# Patient Record
Sex: FEMALE | Race: WHITE | Employment: FULL TIME | URBAN - METROPOLITAN AREA
[De-identification: names, ages, dates, MRNs, and addresses within clinical notes are randomized per-mention and may not be internally consistent; named-entity substitution may affect disease eponyms.]

---

## 2017-07-08 ENCOUNTER — HOSPITAL ENCOUNTER (OUTPATIENT)
Dept: MAMMOGRAPHY | Age: 67
Discharge: HOME | End: 2017-07-08
Attending: FAMILY MEDICINE
Payer: COMMERCIAL

## 2017-07-08 DIAGNOSIS — Z00.00 ENCOUNTER FOR GENERAL ADULT MEDICAL EXAMINATION WITHOUT ABNORMAL FINDINGS: ICD-10-CM

## 2017-07-08 PROCEDURE — 77080 DXA BONE DENSITY AXIAL: CPT

## 2017-07-16 ENCOUNTER — APPOINTMENT (OUTPATIENT)
Dept: CT IMAGING | Age: 67
End: 2017-07-16
Attending: EMERGENCY MEDICINE
Payer: COMMERCIAL

## 2017-07-16 ENCOUNTER — HOSPITAL ENCOUNTER (EMERGENCY)
Age: 67
Discharge: HOME | End: 2017-07-16
Attending: EMERGENCY MEDICINE
Payer: COMMERCIAL

## 2017-07-16 ENCOUNTER — APPOINTMENT (OUTPATIENT)
Dept: GENERAL RADIOLOGY | Age: 67
End: 2017-07-16
Attending: EMERGENCY MEDICINE
Payer: COMMERCIAL

## 2017-07-16 VITALS
RESPIRATION RATE: 10 BRPM | TEMPERATURE: 98.2 F | DIASTOLIC BLOOD PRESSURE: 74 MMHG | SYSTOLIC BLOOD PRESSURE: 113 MMHG | WEIGHT: 117 LBS | OXYGEN SATURATION: 100 % | HEIGHT: 64 IN | HEART RATE: 68 BPM | BODY MASS INDEX: 19.97 KG/M2

## 2017-07-16 DIAGNOSIS — R11.2 NON-INTRACTABLE VOMITING WITH NAUSEA, UNSPECIFIED VOMITING TYPE: ICD-10-CM

## 2017-07-16 DIAGNOSIS — R07.89 OTHER CHEST PAIN: Primary | ICD-10-CM

## 2017-07-16 LAB
ALBUMIN SERPL BCP-MCNC: 3.7 G/DL (ref 3.5–5)
ALBUMIN/GLOB SERPL: 1.1 {RATIO} (ref 1.1–2.2)
ALP SERPL-CCNC: 63 U/L (ref 45–117)
ALT SERPL-CCNC: 26 U/L (ref 12–78)
ANION GAP BLD CALC-SCNC: 10 MMOL/L (ref 5–15)
APPEARANCE UR: ABNORMAL
AST SERPL W P-5'-P-CCNC: 14 U/L (ref 15–37)
BACTERIA URNS QL MICRO: NEGATIVE /HPF
BASOPHILS # BLD AUTO: 0 K/UL (ref 0–0.1)
BASOPHILS # BLD: 0 % (ref 0–1)
BILIRUB SERPL-MCNC: 0.6 MG/DL (ref 0.2–1)
BILIRUB UR QL: NEGATIVE
BUN SERPL-MCNC: 12 MG/DL (ref 6–20)
BUN/CREAT SERPL: 16 (ref 12–20)
CALCIUM SERPL-MCNC: 8.8 MG/DL (ref 8.5–10.1)
CHLORIDE SERPL-SCNC: 103 MMOL/L (ref 97–108)
CK SERPL-CCNC: 71 U/L (ref 26–192)
CO2 SERPL-SCNC: 24 MMOL/L (ref 21–32)
COLOR UR: ABNORMAL
CREAT SERPL-MCNC: 0.75 MG/DL (ref 0.55–1.02)
D DIMER PPP FEU-MCNC: 0.2 MG/L FEU (ref 0–0.65)
EOSINOPHIL # BLD: 0 K/UL (ref 0–0.4)
EOSINOPHIL NFR BLD: 0 % (ref 0–7)
EPITH CASTS URNS QL MICRO: ABNORMAL /LPF
ERYTHROCYTE [DISTWIDTH] IN BLOOD BY AUTOMATED COUNT: 14 % (ref 11.5–14.5)
GLOBULIN SER CALC-MCNC: 3.5 G/DL (ref 2–4)
GLUCOSE SERPL-MCNC: 100 MG/DL (ref 65–100)
GLUCOSE UR STRIP.AUTO-MCNC: NEGATIVE MG/DL
HCT VFR BLD AUTO: 39.4 % (ref 35–47)
HGB BLD-MCNC: 13.3 G/DL (ref 11.5–16)
HGB UR QL STRIP: NEGATIVE
HYALINE CASTS URNS QL MICRO: ABNORMAL /LPF (ref 0–5)
KETONES UR QL STRIP.AUTO: ABNORMAL MG/DL
LEUKOCYTE ESTERASE UR QL STRIP.AUTO: NEGATIVE
LIPASE SERPL-CCNC: 125 U/L (ref 73–393)
LYMPHOCYTES # BLD AUTO: 18 % (ref 12–49)
LYMPHOCYTES # BLD: 1 K/UL (ref 0.8–3.5)
MCH RBC QN AUTO: 28.5 PG (ref 26–34)
MCHC RBC AUTO-ENTMCNC: 33.8 G/DL (ref 30–36.5)
MCV RBC AUTO: 84.4 FL (ref 80–99)
MONOCYTES # BLD: 0.4 K/UL (ref 0–1)
MONOCYTES NFR BLD AUTO: 7 % (ref 5–13)
NEUTS SEG # BLD: 4.1 K/UL (ref 1.8–8)
NEUTS SEG NFR BLD AUTO: 75 % (ref 32–75)
NITRITE UR QL STRIP.AUTO: NEGATIVE
PH UR STRIP: 8.5 [PH] (ref 5–8)
PLATELET # BLD AUTO: 252 K/UL (ref 150–400)
POTASSIUM SERPL-SCNC: 3.5 MMOL/L (ref 3.5–5.1)
PROT SERPL-MCNC: 7.2 G/DL (ref 6.4–8.2)
PROT UR STRIP-MCNC: NEGATIVE MG/DL
RBC # BLD AUTO: 4.67 M/UL (ref 3.8–5.2)
RBC #/AREA URNS HPF: ABNORMAL /HPF (ref 0–5)
SODIUM SERPL-SCNC: 137 MMOL/L (ref 136–145)
SP GR UR REFRACTOMETRY: 1.01 (ref 1–1.03)
T4 FREE SERPL-MCNC: 2.2 NG/DL (ref 0.8–1.5)
TROPONIN I BLD-MCNC: <0.04 NG/ML (ref 0–0.08)
TROPONIN I SERPL-MCNC: <0.04 NG/ML
TSH SERPL DL<=0.05 MIU/L-ACNC: 1.72 UIU/ML (ref 0.36–3.74)
UA: UC IF INDICATED,UAUC: ABNORMAL
UROBILINOGEN UR QL STRIP.AUTO: 0.2 EU/DL (ref 0.2–1)
WBC # BLD AUTO: 5.5 K/UL (ref 3.6–11)
WBC URNS QL MICRO: ABNORMAL /HPF (ref 0–4)

## 2017-07-16 PROCEDURE — 96375 TX/PRO/DX INJ NEW DRUG ADDON: CPT

## 2017-07-16 PROCEDURE — 84439 ASSAY OF FREE THYROXINE: CPT | Performed by: EMERGENCY MEDICINE

## 2017-07-16 PROCEDURE — 74011636320 HC RX REV CODE- 636/320: Performed by: EMERGENCY MEDICINE

## 2017-07-16 PROCEDURE — 84484 ASSAY OF TROPONIN QUANT: CPT | Performed by: EMERGENCY MEDICINE

## 2017-07-16 PROCEDURE — 74011250636 HC RX REV CODE- 250/636: Performed by: EMERGENCY MEDICINE

## 2017-07-16 PROCEDURE — 85025 COMPLETE CBC W/AUTO DIFF WBC: CPT | Performed by: EMERGENCY MEDICINE

## 2017-07-16 PROCEDURE — 81001 URINALYSIS AUTO W/SCOPE: CPT | Performed by: EMERGENCY MEDICINE

## 2017-07-16 PROCEDURE — 74011000258 HC RX REV CODE- 258: Performed by: EMERGENCY MEDICINE

## 2017-07-16 PROCEDURE — 71275 CT ANGIOGRAPHY CHEST: CPT

## 2017-07-16 PROCEDURE — 84443 ASSAY THYROID STIM HORMONE: CPT | Performed by: EMERGENCY MEDICINE

## 2017-07-16 PROCEDURE — 36415 COLL VENOUS BLD VENIPUNCTURE: CPT | Performed by: EMERGENCY MEDICINE

## 2017-07-16 PROCEDURE — 83690 ASSAY OF LIPASE: CPT | Performed by: EMERGENCY MEDICINE

## 2017-07-16 PROCEDURE — 93005 ELECTROCARDIOGRAM TRACING: CPT

## 2017-07-16 PROCEDURE — 96361 HYDRATE IV INFUSION ADD-ON: CPT

## 2017-07-16 PROCEDURE — 74011000250 HC RX REV CODE- 250: Performed by: EMERGENCY MEDICINE

## 2017-07-16 PROCEDURE — 80053 COMPREHEN METABOLIC PANEL: CPT | Performed by: EMERGENCY MEDICINE

## 2017-07-16 PROCEDURE — 99285 EMERGENCY DEPT VISIT HI MDM: CPT

## 2017-07-16 PROCEDURE — 96374 THER/PROPH/DIAG INJ IV PUSH: CPT

## 2017-07-16 PROCEDURE — 82550 ASSAY OF CK (CPK): CPT | Performed by: EMERGENCY MEDICINE

## 2017-07-16 PROCEDURE — 85379 FIBRIN DEGRADATION QUANT: CPT | Performed by: EMERGENCY MEDICINE

## 2017-07-16 RX ORDER — SODIUM CHLORIDE 0.9 % (FLUSH) 0.9 %
10 SYRINGE (ML) INJECTION
Status: COMPLETED | OUTPATIENT
Start: 2017-07-16 | End: 2017-07-16

## 2017-07-16 RX ORDER — FAMOTIDINE 10 MG/ML
20 INJECTION INTRAVENOUS
Status: COMPLETED | OUTPATIENT
Start: 2017-07-16 | End: 2017-07-16

## 2017-07-16 RX ORDER — ONDANSETRON 2 MG/ML
4 INJECTION INTRAMUSCULAR; INTRAVENOUS
Status: COMPLETED | OUTPATIENT
Start: 2017-07-16 | End: 2017-07-16

## 2017-07-16 RX ORDER — ONDANSETRON 4 MG/1
4 TABLET, ORALLY DISINTEGRATING ORAL
Qty: 12 TAB | Refills: 0 | Status: SHIPPED | OUTPATIENT
Start: 2017-07-16 | End: 2019-03-14

## 2017-07-16 RX ADMIN — FAMOTIDINE 20 MG: 10 INJECTION, SOLUTION INTRAVENOUS at 13:43

## 2017-07-16 RX ADMIN — SODIUM CHLORIDE 1000 ML: 900 INJECTION, SOLUTION INTRAVENOUS at 13:40

## 2017-07-16 RX ADMIN — ONDANSETRON 4 MG: 2 INJECTION INTRAMUSCULAR; INTRAVENOUS at 13:40

## 2017-07-16 RX ADMIN — SODIUM CHLORIDE 100 ML: 900 INJECTION, SOLUTION INTRAVENOUS at 16:08

## 2017-07-16 RX ADMIN — IOPAMIDOL 70 ML: 755 INJECTION, SOLUTION INTRAVENOUS at 16:08

## 2017-07-16 RX ADMIN — Medication 10 ML: at 16:08

## 2017-07-16 NOTE — ED NOTES
Discharge instructions and Rx reviewed with pt. and family. No obvious distress noted at time of discharge. Ambulatory on own accord with family.

## 2017-07-16 NOTE — DISCHARGE INSTRUCTIONS
We hope that we have addressed all of your medical concerns. The examination and treatment you received in the Emergency Department were for an emergent problem and were not intended as complete care. It is important that you follow up with your healthcare provider(s) for ongoing care. If your symptoms worsen or do not improve as expected, and you are unable to reach your usual health care provider(s), you should return to the Emergency Department. Today's healthcare is undergoing tremendous change, and patient satisfaction surveys are one of the many tools to assess the quality of medical care. You may receive a survey from the [facility] regarding your experience in the Emergency Department. I hope that your experience has been completely positive, particularly the medical care that I provided. As such, please participate in the survey; anything less than excellent does not meet my expectations or intentions. [facility] and [facility] participate in nationally recognized quality of care measures. If your blood pressure is greater than 120/80, as reported below, we urge that you seek medical care to address the potential of high blood pressure, commonly known as hypertension. Hypertension can be hereditary or can be caused by certain medical conditions, pain, stress, or \"white coat syndrome. \"       Please make an appointment with your health care provider(s) for follow up of your Emergency Department visit. VITALS:   Patient Vitals for the past 8 hrs:   Temp Pulse Resp BP SpO2   07/16/17 1600 - 71 17 110/56 98 %   07/16/17 1400 - 75 15 125/83 100 %   07/16/17 1300 - 71 13 106/65 98 %   07/16/17 1222 98.8 °F (37.1 °C) 75 18 138/80 100 %          Thank you for allowing us to provide you with medical care today. We realize that you have many choices for your emergency care needs.   Please choose us in the future for any continued health care needs. Brigido Dandy, NP    [address] [facility].   Office: [phone]            Recent Results (from the past 24 hour(s))   EKG, 12 LEAD, INITIAL    Collection Time: 07/16/17 12:23 PM   Result Value Ref Range    Ventricular Rate 75 BPM    Atrial Rate 75 BPM    P-R Interval 176 ms    QRS Duration 100 ms    Q-T Interval 410 ms    QTC Calculation (Bezet) 457 ms    Calculated P Axis 77 degrees    Calculated R Axis -73 degrees    Calculated T Axis 48 degrees    Diagnosis       Normal sinus rhythm  Left axis deviation  Low voltage QRS  No previous ECGs available     CBC WITH AUTOMATED DIFF    Collection Time: 07/16/17 12:36 PM   Result Value Ref Range    WBC 5.5 3.6 - 11.0 K/uL    RBC 4.67 3.80 - 5.20 M/uL    HGB 13.3 11.5 - 16.0 g/dL    HCT 39.4 35.0 - 47.0 %    MCV 84.4 80.0 - 99.0 FL    MCH 28.5 26.0 - 34.0 PG    MCHC 33.8 30.0 - 36.5 g/dL    RDW 14.0 11.5 - 14.5 %    PLATELET 846 713 - 072 K/uL    NEUTROPHILS 75 32 - 75 %    LYMPHOCYTES 18 12 - 49 %    MONOCYTES 7 5 - 13 %    EOSINOPHILS 0 0 - 7 %    BASOPHILS 0 0 - 1 %    ABS. NEUTROPHILS 4.1 1.8 - 8.0 K/UL    ABS. LYMPHOCYTES 1.0 0.8 - 3.5 K/UL    ABS. MONOCYTES 0.4 0.0 - 1.0 K/UL    ABS. EOSINOPHILS 0.0 0.0 - 0.4 K/UL    ABS. BASOPHILS 0.0 0.0 - 0.1 K/UL   METABOLIC PANEL, COMPREHENSIVE    Collection Time: 07/16/17 12:36 PM   Result Value Ref Range    Sodium 137 136 - 145 mmol/L    Potassium 3.5 3.5 - 5.1 mmol/L    Chloride 103 97 - 108 mmol/L    CO2 24 21 - 32 mmol/L    Anion gap 10 5 - 15 mmol/L    Glucose 100 65 - 100 mg/dL    BUN 12 6 - 20 MG/DL    Creatinine 0.75 0.55 - 1.02 MG/DL    BUN/Creatinine ratio 16 12 - 20      GFR est AA >60 >60 ml/min/1.73m2    GFR est non-AA >60 >60 ml/min/1.73m2    Calcium 8.8 8.5 - 10.1 MG/DL    Bilirubin, total 0.6 0.2 - 1.0 MG/DL    ALT (SGPT) 26 12 - 78 U/L    AST (SGOT) 14 (L) 15 - 37 U/L    Alk.  phosphatase 63 45 - 117 U/L    Protein, total 7.2 6.4 - 8.2 g/dL    Albumin 3.7 3.5 - 5.0 g/dL    Globulin 3.5 2.0 - 4.0 g/dL    A-G Ratio 1.1 1.1 - 2.2     TROPONIN I    Collection Time: 07/16/17 12:36 PM   Result Value Ref Range    Troponin-I, Qt. <0.04 <0.05 ng/mL   TSH 3RD GENERATION    Collection Time: 07/16/17 12:36 PM   Result Value Ref Range    TSH 1.72 0.36 - 3.74 uIU/mL   T4, FREE    Collection Time: 07/16/17 12:36 PM   Result Value Ref Range    T4, Free 2.2 (H) 0.8 - 1.5 NG/DL   CK W/ REFLX CKMB    Collection Time: 07/16/17 12:36 PM   Result Value Ref Range    CK 71 26 - 192 U/L   LIPASE    Collection Time: 07/16/17 12:36 PM   Result Value Ref Range    Lipase 125 73 - 393 U/L   D DIMER    Collection Time: 07/16/17 12:36 PM   Result Value Ref Range    D-dimer 0.20 0.00 - 0.65 mg/L FEU   URINALYSIS W/ REFLEX CULTURE    Collection Time: 07/16/17  1:45 PM   Result Value Ref Range    Color YELLOW/STRAW      Appearance TURBID (A) CLEAR      Specific gravity 1.012 1.003 - 1.030      pH (UA) 8.5 (H) 5.0 - 8.0      Protein NEGATIVE  NEG mg/dL    Glucose NEGATIVE  NEG mg/dL    Ketone TRACE (A) NEG mg/dL    Bilirubin NEGATIVE  NEG      Blood NEGATIVE  NEG      Urobilinogen 0.2 0.2 - 1.0 EU/dL    Nitrites NEGATIVE  NEG      Leukocyte Esterase NEGATIVE  NEG      WBC 0-4 0 - 4 /hpf    RBC 0-5 0 - 5 /hpf    Epithelial cells FEW FEW /lpf    Bacteria NEGATIVE  NEG /hpf    UA:UC IF INDICATED CULTURE NOT INDICATED BY UA RESULT CNI      Hyaline cast 0-2 0 - 5 /lpf       Cta Chest W Or W Wo Cont    Result Date: 7/16/2017  EXAM:  CTA CHEST W OR W WO CONT INDICATION:   chest pain; requested by cardiology Additional history: Chest pain and discomfort for \"a while now. \" 2 episodes of emesis prior to arrival. Patient reports feeling something rip in her back couple weeks ago. COMPARISON: None. . TECHNIQUE: Precontrast  images were obtained to localize the volume for acquisition.  Multislice helical CT arteriography was performed from the diaphragm to the thoracic inlet during uneventful rapid bolus intravenous contrast administration. Lung and soft tissue windows were generated. Coronal and sagittal images were generated and 3D post processing consisting of coronal maximum intensity images was performed. CT dose reduction was achieved through use of a standardized protocol tailored for this examination and automatic exposure control for dose modulation. Bryan Hargrove FINDINGS: CHEST: Chest wall/thoracic inlet: Within normal limits. Thyroid: Within normal limits. Mediastinum/ovidio: Within normal limits. Heart/vessels: Within normal limits. Lungs/Pleura: Within normal limits. . INCIDENTALLY IMAGED ABDOMEN: Incidentally imaged portions of the abdomen appear unremarkable. . MSK: Within normal limits. .    IMPRESSION: 1. No visualized pulmonary embolus. Nausea and Vomiting: Care Instructions  Your Care Instructions    When you are nauseated, you may feel weak and sweaty and notice a lot of saliva in your mouth. Nausea often leads to vomiting. Most of the time you do not need to worry about nausea and vomiting, but they can be signs of other illnesses. Two common causes of nausea and vomiting are stomach flu and food poisoning. Nausea and vomiting from viral stomach flu will usually start to improve within 24 hours. Nausea and vomiting from food poisoning may last from 12 to 48 hours. The doctor has checked you carefully, but problems can develop later. If you notice any problems or new symptoms, get medical treatment right away. Follow-up care is a key part of your treatment and safety. Be sure to make and go to all appointments, and call your doctor if you are having problems. It's also a good idea to know your test results and keep a list of the medicines you take. How can you care for yourself at home? · To prevent dehydration, drink plenty of fluids, enough so that your urine is light yellow or clear like water. Choose water and other caffeine-free clear liquids until you feel better.  If you have kidney, heart, or liver disease and have to limit fluids, talk with your doctor before you increase the amount of fluids you drink. · Rest in bed until you feel better. · When you are able to eat, try clear soups, mild foods, and liquids until all symptoms are gone for 12 to 48 hours. Other good choices include dry toast, crackers, cooked cereal, and gelatin dessert, such as Jell-O. When should you call for help? Call 911 anytime you think you may need emergency care. For example, call if:  · You passed out (lost consciousness). Call your doctor now or seek immediate medical care if:  · You have symptoms of dehydration, such as:  ¨ Dry eyes and a dry mouth. ¨ Passing only a little dark urine. ¨ Feeling thirstier than usual.  · You have new or worsening belly pain. · You have a new or higher fever. · You vomit blood or what looks like coffee grounds. Watch closely for changes in your health, and be sure to contact your doctor if:  · You have ongoing nausea and vomiting. · Your vomiting is getting worse. · Your vomiting lasts longer than 2 days. · You are not getting better as expected. Where can you learn more? Go to http://buck-dario.info/. Enter 25 494182 in the search box to learn more about \"Nausea and Vomiting: Care Instructions. \"  Current as of: March 20, 2017  Content Version: 11.3  © 1860-3450 [employer]. Care instructions adapted under license by [employer] (which disclaims liability or warranty for this information). If you have questions about a medical condition or this instruction, always ask your healthcare professional. [place] [address] any warranty or liability for your use of this information. Chest Pain: Care Instructions  Your Care Instructions  There are many things that can cause chest pain. Some are not serious and will get better on their own in a few days. But some kinds of chest pain need more testing and treatment. Your doctor may have recommended a follow-up visit in the next 8 to 12 hours. If you are not getting better, you may need more tests or treatment. Even though your doctor has released you, you still need to watch for any problems. The doctor carefully checked you, but sometimes problems can develop later. If you have new symptoms or if your symptoms do not get better, get medical care right away. If you have worse or different chest pain or pressure that lasts more than 5 minutes or you passed out (lost consciousness), call 911 or seek other emergency help right away. A medical visit is only one step in your treatment. Even if you feel better, you still need to do what your doctor recommends, such as going to all suggested follow-up appointments and taking medicines exactly as directed. This will help you recover and help prevent future problems. How can you care for yourself at home? · Rest until you feel better. · Take your medicine exactly as prescribed. Call your doctor if you think you are having a problem with your medicine. · Do not drive after taking a prescription pain medicine. When should you call for help? Call 911 if:  · You passed out (lost consciousness). · You have severe difficulty breathing. · You have symptoms of a heart attack. These may include:  ¨ Chest pain or pressure, or a strange feeling in your chest.  ¨ Sweating. ¨ Shortness of breath. ¨ Nausea or vomiting. ¨ Pain, pressure, or a strange feeling in your back, neck, jaw, or upper belly or in one or both shoulders or arms. ¨ Lightheadedness or sudden weakness. ¨ A fast or irregular heartbeat. After you call 911, the  may tell you to chew 1 adult-strength or 2 to 4 low-dose aspirin. Wait for an ambulance. Do not try to drive yourself. Call your doctor today if:  · You have any trouble breathing. · Your chest pain gets worse. · You are dizzy or lightheaded, or you feel like you may faint.   · You are not getting better as expected. · You are having new or different chest pain. Where can you learn more? Go to http://buck-dario.info/. Enter A120 in the search box to learn more about \"Chest Pain: Care Instructions. \"  Current as of: March 20, 2017  Content Version: 11.3  © 9525-8828 [employer]. Care instructions adapted under license by [employer] (which disclaims liability or warranty for this information). If you have questions about a medical condition or this instruction, always ask your healthcare professional. [address] any warranty or liability for your use of this information.

## 2017-07-16 NOTE — ED PROVIDER NOTES
Patient is a 78 y.o. female presenting with chest pain. Chest Pain (Angina)    Associated symptoms include nausea and vomiting. Pertinent negatives include no abdominal pain, no back pain, no headaches and no shortness of breath. Pt reports abrupt onset on nausea and episodic non bloody vomiting this morning shortly after getting up this morning. She states that she has not felt well for several weeks and has been under evaluation by her PCP. She went to a cardiology appointment (Dr. Kindra Norris) on Friday and was told that her EKG had a few abnormalities but that he was not concerned. 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 Denies fever, cough, cold symptoms, headache, neck pain, visual changes, focal weakness or rash. Denies any difficulty breathing, difficulty swallowing, SOB or abdominal pain. Denies any urinary symptoms, constipation or diarrhea. Pt. Reports eating a slice of toast to calm the nausea but vomited shortly afterward. She has not had any medications today prior to arrival.         History reviewed. No pertinent past medical history. Past Surgical History:   Procedure Laterality Date    DELIVERY            History reviewed. No pertinent family history. Social History     Social History    Marital status:      Spouse name: N/A    Number of children: N/A    Years of education: N/A     Occupational History    Not on file. Social History Main Topics    Smoking status: Never Smoker    Smokeless tobacco: Never Used    Alcohol use Yes    Drug use: Not on file    Sexual activity: Not on file     Other Topics Concern    Not on file     Social History Narrative         ALLERGIES: Pcn [penicillins]    Review of Systems   Constitutional: Negative for activity change and appetite change. HENT: Negative for facial swelling, sore throat and trouble swallowing.     Eyes: Negative. Respiratory: Negative for shortness of breath. Cardiovascular: Positive for chest pain. Gastrointestinal: Positive for nausea and vomiting. Negative for abdominal pain and diarrhea. Genitourinary: Negative for dysuria. Musculoskeletal: Negative for back pain and neck pain. Skin: Negative for color change. Neurological: Negative for headaches. Psychiatric/Behavioral: Negative. Vitals:    07/16/17 1222   BP: 138/80   Pulse: 75   Resp: 18   Temp: 98.8 °F (37.1 °C)   SpO2: 100%   Weight: 53.1 kg (117 lb)   Height: 5' 4\" (1.626 m)            Physical Exam   Constitutional: She is oriented to person, place, and time. She appears well-nourished. White female; non smoker;    HENT:   Head: Normocephalic. Right Ear: External ear normal.   Left Ear: External ear normal.   Mouth/Throat: Oropharynx is clear and moist.   Eyes: Pupils are equal, round, and reactive to light. Neck: Normal range of motion. Neck supple. Cardiovascular: Normal rate and regular rhythm. Pulmonary/Chest: Effort normal and breath sounds normal. She exhibits tenderness. Reports chest pressure   Abdominal: Soft. Bowel sounds are normal.   Musculoskeletal: Normal range of motion. Lymphadenopathy:     She has no cervical adenopathy. Neurological: She is alert and oriented to person, place, and time. Skin: Skin is warm and dry. No rash noted. Nursing note and vitals reviewed. [facility]  ED Course       Procedures           EKG reveals NSR wutg Qwaves V1-V3; ventricular rate 75. Reviewed by Dr. Laquetta Castleman and me. Zack Cifuentes NP    Discussed plan of care with Dr. Laquetta Castleman. Zack Cifuentes NP  Pt has been re-examined and is feeling much better. 7:48 PM  Patient's results and plan of care have been reviewed with her and her family.   Patient and/or family have verbally conveyed their understanding and agreement of the patient's signs, symptoms, diagnosis, treatment and prognosis and additionally agree to follow up as recommended or return to the Emergency Room should her condition change prior to follow-up. Discharge instructions have also been provided to the patient with some educational information regarding her diagnosis as well a list of reasons why she would want to return to the ER prior to her follow-up appointment should her condition change. Brad King, LUCA

## 2017-07-16 NOTE — ED TRIAGE NOTES
TRIAGE NOTE: Patient arrives via EMS from [facility] for chest pain. Patient reports discomfort for \"a while now\". Seen by PCP last week. Patient denies pain, \"more discomfort\", a tightness that goes across her back. Patient reports feeling something rip in her back a couple weeks ago. 2 episodes of vomiting prior to arrival. 324mg of aspiring en route.

## 2017-07-17 LAB
ATRIAL RATE: 75 BPM
CALCULATED P AXIS, ECG09: 77 DEGREES
CALCULATED R AXIS, ECG10: -73 DEGREES
CALCULATED T AXIS, ECG11: 48 DEGREES
DIAGNOSIS, 93000: NORMAL
P-R INTERVAL, ECG05: 176 MS
Q-T INTERVAL, ECG07: 410 MS
QRS DURATION, ECG06: 100 MS
QTC CALCULATION (BEZET), ECG08: 457 MS
VENTRICULAR RATE, ECG03: 75 BPM

## 2018-02-23 ENCOUNTER — HOSPITAL ENCOUNTER (OUTPATIENT)
Dept: CT IMAGING | Age: 68
Discharge: HOME | End: 2018-02-23
Payer: COMMERCIAL

## 2018-02-23 DIAGNOSIS — F07.81 POST CONCUSSION SYNDROME: ICD-10-CM

## 2018-02-23 PROCEDURE — 70450 CT HEAD/BRAIN W/O DYE: CPT

## 2018-03-13 ENCOUNTER — HOSPITAL ENCOUNTER (OUTPATIENT)
Dept: GENERAL RADIOLOGY | Age: 68
Discharge: HOME | End: 2018-03-13
Payer: COMMERCIAL

## 2018-03-13 DIAGNOSIS — M54.2 NECK PAIN: ICD-10-CM

## 2018-03-13 PROCEDURE — 72052 X-RAY EXAM NECK SPINE 6/>VWS: CPT

## 2018-06-04 ENCOUNTER — HOSPITAL ENCOUNTER (OUTPATIENT)
Dept: MAMMOGRAPHY | Age: 68
Discharge: HOME | End: 2018-06-04
Attending: FAMILY MEDICINE
Payer: COMMERCIAL

## 2018-06-04 DIAGNOSIS — Z12.39 SCREENING BREAST EXAMINATION: ICD-10-CM

## 2018-06-04 PROCEDURE — 77067 SCR MAMMO BI INCL CAD: CPT

## 2019-02-11 ENCOUNTER — HOSPITAL ENCOUNTER (OUTPATIENT)
Dept: MRI IMAGING | Age: 69
Discharge: HOME | End: 2019-02-11
Attending: PHYSICAL MEDICINE & REHABILITATION
Payer: COMMERCIAL

## 2019-02-11 DIAGNOSIS — M43.10 ANTEROLISTHESIS: ICD-10-CM

## 2019-02-11 PROCEDURE — 72141 MRI NECK SPINE W/O DYE: CPT

## 2019-03-14 ENCOUNTER — OFFICE VISIT (OUTPATIENT)
Dept: NEUROLOGY | Age: 69
End: 2019-03-14

## 2019-03-14 VITALS
SYSTOLIC BLOOD PRESSURE: 108 MMHG | DIASTOLIC BLOOD PRESSURE: 68 MMHG | HEART RATE: 71 BPM | BODY MASS INDEX: 20.43 KG/M2 | OXYGEN SATURATION: 98 % | RESPIRATION RATE: 18 BRPM | WEIGHT: 119 LBS

## 2019-03-14 DIAGNOSIS — R42 DIZZINESS: ICD-10-CM

## 2019-03-14 DIAGNOSIS — F07.81 POST CONCUSSION SYNDROME: Primary | ICD-10-CM

## 2019-03-14 RX ORDER — CYCLOBENZAPRINE HCL 5 MG
5 TABLET ORAL AS NEEDED
COMMUNITY
End: 2019-06-10

## 2019-03-14 RX ORDER — ASCORBIC ACID 500 MG
TABLET ORAL
COMMUNITY

## 2019-03-14 RX ORDER — VITAMIN E CAP 100 UNIT 100 UNIT
CAP ORAL DAILY
COMMUNITY

## 2019-03-14 RX ORDER — MELATONIN
DAILY
COMMUNITY

## 2019-03-14 RX ORDER — CALCIUM CARBONATE 500(1250)
TABLET ORAL DAILY
COMMUNITY

## 2019-03-14 RX ORDER — LANOLIN ALCOHOL/MO/W.PET/CERES
1000 CREAM (GRAM) TOPICAL DAILY
COMMUNITY

## 2019-03-14 NOTE — PROGRESS NOTES
Shannan Hernandez PATIENT EVALUATION/CONSULTATION       PATIENT NAME: Vitor Phillips    MRN: [id number]    REASON FOR CONSULTATION: Head injury    03/14/19      Previous records (physician notes, laboratory reports, and radiology reports) and imaging studies were reviewed and summarized. My recommendations will be communicated back to the patient's physician(s) via electronic medical record and/or by [facility] [facility] mail. HISTORY OF PRESENT ILLNESS:  Vitor Phillips is a 77 y.o. right handed female presenting for evaluation of head injury. She reports a fall caused by brief syncope with frontal head injury in 2017. She underwent extensive cardiac evaluations which were normal per patient. No reported convulsions or confusion after the incident. She did not experience any significant symptoms following her head injury. Approximately 3 months later, she hit the frontal region again with the back richard of her car, no LOC but saw \"stars\". She noted cognitive issues following her second head injury. She specifically reports difficulty playing her organ. She did not note significant headaches but reported cervicalgia/shoulder pain b/l afterwards with associated dizziness. Things improved with time, however she is still experiencing some intermittent \"foggy headedness\" and dizziness. She also endorses some occasional \"electrical feeling\" over the entire head lasting several hours and occurring once monthly at most with associated \"bruising/discoloration\" over the L temple region which may persist up to 48 hours. She shows pictures of this discoloration today. She denies vision loss but endorses some tenderness to palpation. She completed concussion therapy for PCS early 2018. She underwent head CT/MRI C spine since her injuries showing no acute process or significant canal/neuroforaminal stenosis. PAST MEDICAL HISTORY:  History reviewed. No pertinent past medical history.     PAST SURGICAL HISTORY:  Past Surgical History:   Procedure Laterality Date    DELIVERY          FAMILY HISTORY:   History reviewed. No pertinent family history. SOCIAL HISTORY:  Social History     Socioeconomic History    Marital status:      Spouse name: Not on file    Number of children: Not on file    Years of education: Not on file    Highest education level: Not on file   Tobacco Use    Smoking status: Never Smoker    Smokeless tobacco: Never Used   Substance and Sexual Activity    Alcohol use: Yes         MEDICATIONS:   Current Outpatient Medications   Medication Sig Dispense Refill    cyanocobalamin (VITAMIN B-12) 1,000 mcg tablet Take 1,000 mcg by mouth daily.  ascorbic acid, vitamin C, (VITAMIN C) 500 mg tablet Take  by mouth.  calcium carbonate (OS-CHERIE) 500 mg calcium (1,250 mg) tablet Take  by mouth daily.  cholecalciferol (VITAMIN D3) 1,000 unit tablet Take  by mouth daily.  vitamin e (E GEMS) 100 unit capsule Take  by mouth daily.  cyclobenzaprine (FLEXERIL) 5 mg tablet Take 5 mg by mouth as needed for Muscle Spasm(s). ALLERGIES:  Allergies   Allergen Reactions    Pcn [Penicillins] Not Reported This Time         REVIEW OF SYSTEMS:  10 point ROS reviewed with patient. Please see scanned document under media. PHYSICAL EXAM:  Vital Signs:   Visit Vitals  /68   Pulse 71   Resp 18   Wt 54 kg (119 lb)   SpO2 98%   BMI 20.43 kg/m²        General Medical Exam:  General:  Well appearing, comfortable, in no apparent distress. Eyes/ENT: see cranial nerve examination. Neck: No masses appreciated. Full range of motion without tenderness. Respiratory:  Clear to auscultation, good air entry bilaterally. Cardiac:  Regular rate and rhythm, no murmur. GI:  Soft, non-tender, non-distended abdomen. Bowel sounds normal. No masses, organomegaly. Extremities:  No deformities, edema, or skin discoloration. Skin:  No rashes or lesions.   B/L palpable, non-tender temporal arteries. Neurological:  · Mental Status:  Alert and oriented to person, place, and time with fluent speech. · Cranial Nerves:   CNII/III/IV/VI: visual fields full to confrontation, EOMI, PERRL, no ptosis or nystagmus. CN V: Facial sensation intact bilaterally, masseter 5/5   CN VII: Facial muscles symmetric and strong   CN VIII: Hears finger rub well bilaterally, intact vestibular function   CN IX/X: Normal palatal movement   CN XI: Full strength shoulder shrug bilaterally   CN XII: Tongue protrusion full and midline without fasciculation or atrophy  · Motor: Normal tone and muscle bulk with no pronator drift. Individual muscle group testing:  Shoulder abduction:   Left:5/5   Right : 5/5    Shoulder adduction:   Left:5/5   Right : 5/5    Elbow flexion:      Left:5/5   Right : 5/5  Elbow extension:    Left:5/5   Right : 5/5   Wrist flexion:    Left:5/5   Right : 5/5  Wrist extension:    Left:5/5   Right : 5/5  Arm pronation:   Left:5/5   Right : 5/5  Arm supination:   Left:5/5   Right : 5/5    Finger flexion:    Left:5/5   Right : 5/5    Finger extension:   Left:5/5   Right : 5/5   Finger abduction:  Left:5/5   Right : 5/5   Finger adduction:   Left:5/5   Right : 5/5  Hip flexion:     Left:5/5   Right : 5/5         Hip extension:   Left:5/5   Right : 5/5    Knee flexion:    Left:5/5   Right : 5/5    Knee extension:   Left:5/5   Right : 5/5    Dorsiflexion:     Left:5/5   Right : 5/5  Plantar flexion:    Left:5/5   Right : 5/5      · MSRs: No crossed adductors or clonus. RIGHT  LEFT   Brachioradialis 2+ 2+   Biceps 2+ 2+   Triceps 2+ 2+   Knee 2+ 2+   Achilles 2+ 2+        Plantar response Downward Downward          · Sensation: Normal and symmetric perception of pinprick, temperature, light touch, proprioception, and vibration; (-) Romberg. · Coordination: No dysmetria.  Normal rapid alternating movements; finger-to-nose and heel-to- shin testing are within normal limits. · Gait: Normal native gait    PERTINENT DATA:  INTERNAL RECORDS:  The patient's electronic medical record was reviewed. The relevant details include:     CT Results (maximum last 3): Results from [place] encounter on 02/23/18   CT HEAD WO CONT    Narrative INDICATION: Post concussion syndrome. Exam: Noncontrast CT of the brain is performed with 5 mm collimation. CT dose reduction was achieved with the use of the standardized protocol  tailored for this examination and automatic exposure control for dose  modulation. FINDINGS: There is no acute intracranial hemorrhage, mass, mass effect or  herniation. Ventricular system is normal. The gray-white matter differentiation  is well-preserved. The mastoid air cells are well pneumatized. The visualized  paranasal sinuses are normal.      Impression IMPRESSION: No acute intracranial hemorrhage, mass or infarct. MRI Results (maximum last 3): Results from [place] encounter on 02/11/19   MRI CERV SPINE WO CONT    Narrative EXAM: MRI CERV SPINE WO CONT    INDICATION: Anterolisthesis with instability left hand weakness. Spondylolisthesis, site unspecified    COMPARISON: None    TECHNIQUE: MR imaging of the cervical spine was performed using the following  sequences: sagittal T1, T2, STIR;  axial T2, T1.     CONTRAST:  None. FINDINGS:    There is normal alignment of the cervical spine. Vertebral body heights are  maintained. Marrow signal is normal. Vertebral body and disc space heights are  maintained. The craniocervical junction is intact. Focal prominence of the central canal  measuring less than a millimeter in diameter extends from C4-C5 through C6-C7. The course, caliber, and signal intensity of the spinal cord are normal.      The paraspinal soft tissues are within normal limits. C2-C3: Mild right facet arthropathy. No neuroforaminal narrowing or spinal canal  stenosis. C3-C4: Mild left facet arthropathy.  No neuroforaminal narrowing or spinal canal  stenosis. C4-C5: Minimal left facet arthropathy. No neuroforaminal narrowing or spinal  canal stenosis. C5-C6: No neuroforaminal narrowing or spinal canal stenosis. C6-C7: No herniation or stenosis. C7-T1: No herniation or stenosis. Impression IMPRESSION:  Multilevel mild facet arthropathy. No neuroforaminal narrowing or spinal canal  stenosis. Prominent central canal described above. ASSESSMENT:      ICD-10-CM ICD-9-CM    1. Post concussion syndrome F07.81 310.2 REFERRAL TO PHYSICAL THERAPY   2. Dizziness R42 780.4 REFERRAL TO PHYSICAL THERAPY   77year old female presenting with head injury 2017 following a brief syncopal episode, subsequent recurrent head trauma 3 months later with post concussive symptoms. She reports residual cognitive slowing, occasional dizziness since her head injuries. Neurological examination is non-focal and essentially normal today. Head CT from 2/2018 reviewed and without acute intracranial process or structural abnormality. She recently also completed MRI C spine showing no significant stenosis or neuroforaminal narrowing. We discussed proceeding with further concussion therapy in an attempt to address her residual PCS symptoms which may take longer to resolve given her repetitive injuries. PLAN:  · PT/Concussion Therapy    Follow-up Disposition:  Return in about 3 months (around 6/14/2019). I have discussed the diagnosis with the patient and the intended plan as seen in the above orders. Patient is in agreement. The patient has received an after-visit summary and questions were answered concerning future plans. Yennifer Ortiz,   Staff Neurologist  Diplomate, [facility] Board of Psychiatry & Neurology

## 2019-06-10 ENCOUNTER — OFFICE VISIT (OUTPATIENT)
Dept: NEUROLOGY | Age: 69
End: 2019-06-10

## 2019-06-10 VITALS
WEIGHT: 120 LBS | RESPIRATION RATE: 18 BRPM | SYSTOLIC BLOOD PRESSURE: 110 MMHG | HEART RATE: 80 BPM | OXYGEN SATURATION: 98 % | BODY MASS INDEX: 20.6 KG/M2 | DIASTOLIC BLOOD PRESSURE: 70 MMHG

## 2019-06-10 DIAGNOSIS — F07.81 POST CONCUSSION SYNDROME: Primary | ICD-10-CM

## 2019-06-10 DIAGNOSIS — R42 DIZZINESS: ICD-10-CM

## 2019-06-10 NOTE — PROGRESS NOTES
Neurology Clinic Follow up Note    Patient ID:  Gopal Clarke  [id number]  77 y.o.  1950      Ms. Chapo Torres is here for follow up today of  Chief Complaint   Patient presents with    Neurologic Problem          Last Appointment With Me:  3/14/2019     77 y.o. right handed female presenting for evaluation of head injury. She reports a fall caused by brief syncope with frontal head injury in 2017. She underwent extensive cardiac evaluations which were normal per patient. No reported convulsions or confusion after the incident. She did not experience any significant symptoms following her head injury. Approximately 3 months later, she hit the frontal region again with the back richard of her car, no LOC but saw \"stars\". She noted cognitive issues following her second head injury. She specifically reports difficulty playing her organ. She did not note significant headaches but reported cervicalgia/shoulder pain b/l afterwards with associated dizziness. Things improved with time, however she is still experiencing some intermittent \"foggy headedness\" and dizziness. She also endorses some occasional \"electrical feeling\" over the entire head lasting several hours and occurring once monthly at most with associated \"bruising/discoloration\" over the L temple region which may persist up to 48 hours. She shows pictures of this discoloration today. She denies vision loss but endorses some tenderness to palpation. She completed concussion therapy for PCS early 2018. She underwent head CT/MRI C spine since her injuries showing no acute process or significant canal/neuroforaminal stenosis. Interval History:   Here for f/u of PCS. She states symptoms are much improved. She is working with PT for her cervicalgia/shoulder pain which is also improved. No reported headaches. Head \"fog\" is resolved. She is engaged in PT once weekly currently.         PMHx/ PSHx/ FHx/ SHx:  Reviewed and unchanged previous visit. History reviewed. No pertinent past medical history. ROS:  Comprehensive review of systems negative except for as noted above. Objective:       Meds:  Current Outpatient Medications   Medication Sig Dispense Refill    cyanocobalamin (VITAMIN B-12) 1,000 mcg tablet Take 1,000 mcg by mouth daily.  ascorbic acid, vitamin C, (VITAMIN C) 500 mg tablet Take  by mouth.  calcium carbonate (OS-CHERIE) 500 mg calcium (1,250 mg) tablet Take  by mouth daily.  cholecalciferol (VITAMIN D3) 1,000 unit tablet Take  by mouth daily.  vitamin e (E GEMS) 100 unit capsule Take  by mouth daily. Exam:  Visit Vitals  /70   Pulse 80   Resp 18   Wt 54.4 kg (120 lb)   SpO2 98%   BMI 20.60 kg/m²     NEUROLOGICAL EXAM:  General: Awake, alert, speech fluent  CN: PERRL, EOMI without nystagmus, VFF to confrontation, facial sensation and strength are normal and symmetric, hearing is intact to finger rub bilaterally, palate and tongue movements are intact and symmetric. Motor: Normal tone, bulk and strength bilaterally. Reflexes: 2/4 and symmetric, plantar stimulation is flexor. Coordination: FNF, ZAHIRA, HTS intact. Sensation: LT intact throughout. Gait: Normal-based and steady. LABS  Results for orders placed or performed during the hospital encounter of 07/16/17   CBC WITH AUTOMATED DIFF   Result Value Ref Range    WBC 5.5 3.6 - 11.0 K/uL    RBC 4.67 3.80 - 5.20 M/uL    HGB 13.3 11.5 - 16.0 g/dL    HCT 39.4 35.0 - 47.0 %    MCV 84.4 80.0 - 99.0 FL    MCH 28.5 26.0 - 34.0 PG    MCHC 33.8 30.0 - 36.5 g/dL    RDW 14.0 11.5 - 14.5 %    PLATELET 068 477 - 118 K/uL    NEUTROPHILS 75 32 - 75 %    LYMPHOCYTES 18 12 - 49 %    MONOCYTES 7 5 - 13 %    EOSINOPHILS 0 0 - 7 %    BASOPHILS 0 0 - 1 %    ABS. NEUTROPHILS 4.1 1.8 - 8.0 K/UL    ABS. LYMPHOCYTES 1.0 0.8 - 3.5 K/UL    ABS. MONOCYTES 0.4 0.0 - 1.0 K/UL    ABS. EOSINOPHILS 0.0 0.0 - 0.4 K/UL    ABS.  BASOPHILS 0.0 0.0 - 0.1 K/UL   METABOLIC PANEL, COMPREHENSIVE   Result Value Ref Range    Sodium 137 136 - 145 mmol/L    Potassium 3.5 3.5 - 5.1 mmol/L    Chloride 103 97 - 108 mmol/L    CO2 24 21 - 32 mmol/L    Anion gap 10 5 - 15 mmol/L    Glucose 100 65 - 100 mg/dL    BUN 12 6 - 20 MG/DL    Creatinine 0.75 0.55 - 1.02 MG/DL    BUN/Creatinine ratio 16 12 - 20      GFR est AA >60 >60 ml/min/1.73m2    GFR est non-AA >60 >60 ml/min/1.73m2    Calcium 8.8 8.5 - 10.1 MG/DL    Bilirubin, total 0.6 0.2 - 1.0 MG/DL    ALT (SGPT) 26 12 - 78 U/L    AST (SGOT) 14 (L) 15 - 37 U/L    Alk.  phosphatase 63 45 - 117 U/L    Protein, total 7.2 6.4 - 8.2 g/dL    Albumin 3.7 3.5 - 5.0 g/dL    Globulin 3.5 2.0 - 4.0 g/dL    A-G Ratio 1.1 1.1 - 2.2     TROPONIN I   Result Value Ref Range    Troponin-I, Qt. <0.04 <0.05 ng/mL   TSH 3RD GENERATION   Result Value Ref Range    TSH 1.72 0.36 - 3.74 uIU/mL   T4, FREE   Result Value Ref Range    T4, Free 2.2 (H) 0.8 - 1.5 NG/DL   CK W/ REFLX CKMB   Result Value Ref Range    CK 71 26 - 192 U/L   LIPASE   Result Value Ref Range    Lipase 125 73 - 393 U/L   D DIMER   Result Value Ref Range    D-dimer 0.20 0.00 - 0.65 mg/L FEU   URINALYSIS W/ REFLEX CULTURE   Result Value Ref Range    Color YELLOW/STRAW      Appearance TURBID (A) CLEAR      Specific gravity 1.012 1.003 - 1.030      pH (UA) 8.5 (H) 5.0 - 8.0      Protein NEGATIVE  NEG mg/dL    Glucose NEGATIVE  NEG mg/dL    Ketone TRACE (A) NEG mg/dL    Bilirubin NEGATIVE  NEG      Blood NEGATIVE  NEG      Urobilinogen 0.2 0.2 - 1.0 EU/dL    Nitrites NEGATIVE  NEG      Leukocyte Esterase NEGATIVE  NEG      WBC 0-4 0 - 4 /hpf    RBC 0-5 0 - 5 /hpf    Epithelial cells FEW FEW /lpf    Bacteria NEGATIVE  NEG /hpf    UA:UC IF INDICATED CULTURE NOT INDICATED BY UA RESULT CNI      Hyaline cast 0-2 0 - 5 /lpf   POC TROPONIN-I   Result Value Ref Range    Troponin-I (POC) <0.04 0.00 - 0.08 ng/mL   EKG, 12 LEAD, INITIAL   Result Value Ref Range    Ventricular Rate 75 BPM    Atrial Rate 75 BPM    P-R Interval 176 ms    QRS Duration 100 ms    Q-T Interval 410 ms    QTC Calculation (Bezet) 457 ms    Calculated P Axis 77 degrees    Calculated R Axis -73 degrees    Calculated T Axis 48 degrees    Diagnosis       Normal sinus rhythm  Left axis deviation  Low voltage QRS  No previous ECGs available  Confirmed by Hayden Condon M.D., Juaquin Perez ([id number]) on 7/17/2017 10:21:06 AM         IMAGING:  MRI Results (most recent):  Results from [place] encounter on 02/11/19   MRI CERV SPINE WO CONT    Narrative EXAM: MRI CERV SPINE WO CONT    INDICATION: Anterolisthesis with instability left hand weakness. Spondylolisthesis, site unspecified    COMPARISON: None    TECHNIQUE: MR imaging of the cervical spine was performed using the following  sequences: sagittal T1, T2, STIR;  axial T2, T1.     CONTRAST:  None. FINDINGS:    There is normal alignment of the cervical spine. Vertebral body heights are  maintained. Marrow signal is normal. Vertebral body and disc space heights are  maintained. The craniocervical junction is intact. Focal prominence of the central canal  measuring less than a millimeter in diameter extends from C4-C5 through C6-C7. The course, caliber, and signal intensity of the spinal cord are normal.      The paraspinal soft tissues are within normal limits. C2-C3: Mild right facet arthropathy. No neuroforaminal narrowing or spinal canal  stenosis. C3-C4: Mild left facet arthropathy. No neuroforaminal narrowing or spinal canal  stenosis. C4-C5: Minimal left facet arthropathy. No neuroforaminal narrowing or spinal  canal stenosis. C5-C6: No neuroforaminal narrowing or spinal canal stenosis. C6-C7: No herniation or stenosis. C7-T1: No herniation or stenosis. Impression IMPRESSION:  Multilevel mild facet arthropathy. No neuroforaminal narrowing or spinal canal  stenosis. Prominent central canal described above.            Assessment:     Encounter Diagnoses     ICD-10-CM ICD-9-CM 1. Post concussion syndrome F07.81 310.2   2. Dizziness R42 66.4     77year old female here for f/u of head injury 2017 following a brief syncopal episode, subsequent recurrent head trauma 3 months later with post concussive symptoms. She reports residual cognitive slowing, occasional dizziness since her head injuries. Neurological examination is non-focal and essentially normal.  Head CT from 2/2018 reviewed and without acute intracranial process or structural abnormality. She also completed MRI C spine showing no significant stenosis or neuroforaminal narrowing. We discussed proceeding with further concussion therapy in an attempt to address her residual PCS symptoms at last visit which may take longer to resolve given her repetitive injuries. Symptoms are much improved following initiation of concussion therapy. She will complete her PT and requests additional cognitive therapy through [address] [place] [address] (prescription previously provided). Plan:   Continue concussion therapy    Follow-up and Dispositions    · Return if symptoms worsen or fail to improve.          Signed:  Jackquelyn Meckel,   6/10/2019

## 2019-06-10 NOTE — PATIENT INSTRUCTIONS
PRESCRIPTION REFILL POLICY [facility] Statement to Patients April 1, 2014 In an effort to ensure the large volume of patient prescription refills is processed in the most efficient and expeditious manner, we are asking our patients to assist us by calling your Pharmacy for all prescription refills, this will include also your  Mail Order Pharmacy. The pharmacy will contact our office electronically to continue the refill process. Please do not wait until the last minute to call your pharmacy. We need at least 48 hours (2days) to fill prescriptions. We also encourage you to call your pharmacy before going to  your prescription to make sure it is ready. With regard to controlled substance prescription refill requests (narcotic refills) that need to be picked up at our office, we ask your cooperation by providing us with at least 72 hours (3days) notice that you will need a refill. We will not refill narcotic prescription refill requests after 4:00pm on any weekday, Monday through Thursday, or after 2:00pm on Fridays, or on the weekends. We encourage everyone to explore another way of getting your prescription refill request processed using [employer], our patient web portal through our electronic medical record system. [employer] is an efficient and effective way to communicate your medication request directly to the office and  downloadable as an dana on your smart phone . [employer] also features a review functionality that allows you to view your medication list as well as leave messages for your physician. Are you ready to get connected? If so please review the attatched instructions or speak to any of our staff to get you set up right away! Thank you so much for your cooperation. Should you have any questions please contact our Practice Administrator. The Physicians and Staff,  [facility]rology [facility]

## 2019-07-25 ENCOUNTER — HOSPITAL ENCOUNTER (OUTPATIENT)
Dept: MAMMOGRAPHY | Age: 69
Discharge: HOME | End: 2019-07-25
Attending: FAMILY MEDICINE
Payer: COMMERCIAL

## 2019-07-25 DIAGNOSIS — M81.0 OSTEOPOROSIS WITHOUT CURRENT PATHOLOGICAL FRACTURE, UNSPECIFIED OSTEOPOROSIS TYPE: ICD-10-CM

## 2019-07-25 PROCEDURE — 77080 DXA BONE DENSITY AXIAL: CPT

## 2022-04-05 ENCOUNTER — TRANSCRIBE ORDER (OUTPATIENT)
Dept: SCHEDULING | Age: 72
End: 2022-04-05

## 2022-04-05 DIAGNOSIS — M81.0 OSTEOPOROSIS WITHOUT CURRENT PATHOLOGICAL FRACTURE, UNSPECIFIED OSTEOPOROSIS TYPE: Primary | ICD-10-CM

## 2024-09-02 ENCOUNTER — ANESTHESIA (OUTPATIENT)
Facility: HOSPITAL | Age: 74
End: 2024-09-02
Payer: COMMERCIAL

## 2024-09-02 ENCOUNTER — HOSPITAL ENCOUNTER (OUTPATIENT)
Facility: HOSPITAL | Age: 74
Setting detail: OBSERVATION
Discharge: HOME | End: 2024-09-05
Attending: EMERGENCY MEDICINE | Admitting: SURGERY
Payer: COMMERCIAL

## 2024-09-02 ENCOUNTER — ANESTHESIA EVENT (OUTPATIENT)
Facility: HOSPITAL | Age: 74
End: 2024-09-02
Payer: COMMERCIAL

## 2024-09-02 ENCOUNTER — APPOINTMENT (OUTPATIENT)
Facility: HOSPITAL | Age: 74
End: 2024-09-02
Payer: COMMERCIAL

## 2024-09-02 DIAGNOSIS — K35.30 ACUTE APPENDICITIS WITH LOCALIZED PERITONITIS, WITHOUT PERFORATION, ABSCESS, OR GANGRENE: Primary | ICD-10-CM

## 2024-09-02 LAB
ALBUMIN SERPL-MCNC: 3.8 G/DL (ref 3.5–5)
ALBUMIN/GLOB SERPL: 1 (ref 1.1–2.2)
ALP SERPL-CCNC: 88 U/L (ref 45–117)
ALT SERPL-CCNC: 20 U/L (ref 12–78)
ANION GAP SERPL CALC-SCNC: 7 MMOL/L (ref 5–15)
APPEARANCE UR: CLEAR
AST SERPL-CCNC: 11 U/L (ref 15–37)
BACTERIA URNS QL MICRO: NEGATIVE /HPF
BASOPHILS # BLD: 0 K/UL (ref 0–0.1)
BASOPHILS NFR BLD: 1 % (ref 0–1)
BILIRUB SERPL-MCNC: 1.2 MG/DL (ref 0.2–1)
BILIRUB UR QL: NEGATIVE
BUN SERPL-MCNC: 10 MG/DL (ref 6–20)
BUN/CREAT SERPL: 13 (ref 12–20)
CALCIUM SERPL-MCNC: 9.3 MG/DL (ref 8.5–10.1)
CHLORIDE SERPL-SCNC: 102 MMOL/L (ref 97–108)
CO2 SERPL-SCNC: 28 MMOL/L (ref 21–32)
COLOR UR: NORMAL
COMMENT:: NORMAL
CREAT SERPL-MCNC: 0.79 MG/DL (ref 0.55–1.02)
DIFFERENTIAL METHOD BLD: NORMAL
EOSINOPHIL # BLD: 0.1 K/UL (ref 0–0.4)
EOSINOPHIL NFR BLD: 2 % (ref 0–7)
EPITH CASTS URNS QL MICRO: NORMAL /LPF
ERYTHROCYTE [DISTWIDTH] IN BLOOD BY AUTOMATED COUNT: 13.5 % (ref 11.5–14.5)
GLOBULIN SER CALC-MCNC: 3.8 G/DL (ref 2–4)
GLUCOSE SERPL-MCNC: 97 MG/DL (ref 65–100)
GLUCOSE UR STRIP.AUTO-MCNC: NEGATIVE MG/DL
HCT VFR BLD AUTO: 38.8 % (ref 35–47)
HGB BLD-MCNC: 12.7 G/DL (ref 11.5–16)
HGB UR QL STRIP: NEGATIVE
HYALINE CASTS URNS QL MICRO: NORMAL /LPF (ref 0–5)
IMM GRANULOCYTES # BLD AUTO: 0 K/UL (ref 0–0.04)
IMM GRANULOCYTES NFR BLD AUTO: 0 % (ref 0–0.5)
KETONES UR QL STRIP.AUTO: NEGATIVE MG/DL
LEUKOCYTE ESTERASE UR QL STRIP.AUTO: NEGATIVE
LIPASE SERPL-CCNC: 29 U/L (ref 13–75)
LYMPHOCYTES # BLD: 1.3 K/UL (ref 0.8–3.5)
LYMPHOCYTES NFR BLD: 20 % (ref 12–49)
MCH RBC QN AUTO: 27.6 PG (ref 26–34)
MCHC RBC AUTO-ENTMCNC: 32.7 G/DL (ref 30–36.5)
MCV RBC AUTO: 84.3 FL (ref 80–99)
MONOCYTES # BLD: 0.5 K/UL (ref 0–1)
MONOCYTES NFR BLD: 8 % (ref 5–13)
NEUTS SEG # BLD: 4.5 K/UL (ref 1.8–8)
NEUTS SEG NFR BLD: 69 % (ref 32–75)
NITRITE UR QL STRIP.AUTO: NEGATIVE
NRBC # BLD: 0 K/UL (ref 0–0.01)
NRBC BLD-RTO: 0 PER 100 WBC
PH UR STRIP: 6 (ref 5–8)
PLATELET # BLD AUTO: 239 K/UL (ref 150–400)
PMV BLD AUTO: 9.3 FL (ref 8.9–12.9)
POTASSIUM SERPL-SCNC: 3.6 MMOL/L (ref 3.5–5.1)
PROT SERPL-MCNC: 7.6 G/DL (ref 6.4–8.2)
PROT UR STRIP-MCNC: NEGATIVE MG/DL
RBC # BLD AUTO: 4.6 M/UL (ref 3.8–5.2)
RBC #/AREA URNS HPF: NORMAL /HPF (ref 0–5)
SODIUM SERPL-SCNC: 137 MMOL/L (ref 136–145)
SP GR UR REFRACTOMETRY: 1.01 (ref 1–1.03)
SPECIMEN HOLD: NORMAL
SPECIMEN HOLD: NORMAL
UROBILINOGEN UR QL STRIP.AUTO: 0.2 EU/DL (ref 0.2–1)
WBC # BLD AUTO: 6.4 K/UL (ref 3.6–11)
WBC URNS QL MICRO: NORMAL /HPF (ref 0–4)

## 2024-09-02 PROCEDURE — 74177 CT ABD & PELVIS W/CONTRAST: CPT

## 2024-09-02 PROCEDURE — 3700000001 HC ADD 15 MINUTES (ANESTHESIA): Performed by: SURGERY

## 2024-09-02 PROCEDURE — 83690 ASSAY OF LIPASE: CPT

## 2024-09-02 PROCEDURE — 85025 COMPLETE CBC W/AUTO DIFF WBC: CPT

## 2024-09-02 PROCEDURE — 7100000000 HC PACU RECOVERY - FIRST 15 MIN: Performed by: SURGERY

## 2024-09-02 PROCEDURE — 6360000002 HC RX W HCPCS: Performed by: NURSE ANESTHETIST, CERTIFIED REGISTERED

## 2024-09-02 PROCEDURE — 81001 URINALYSIS AUTO W/SCOPE: CPT

## 2024-09-02 PROCEDURE — 2709999900 HC NON-CHARGEABLE SUPPLY: Performed by: SURGERY

## 2024-09-02 PROCEDURE — 6360000002 HC RX W HCPCS: Performed by: SURGERY

## 2024-09-02 PROCEDURE — 2720000010 HC SURG SUPPLY STERILE: Performed by: SURGERY

## 2024-09-02 PROCEDURE — 80053 COMPREHEN METABOLIC PANEL: CPT

## 2024-09-02 PROCEDURE — 3600000013 HC SURGERY LEVEL 3 ADDTL 15MIN: Performed by: SURGERY

## 2024-09-02 PROCEDURE — 3600000003 HC SURGERY LEVEL 3 BASE: Performed by: SURGERY

## 2024-09-02 PROCEDURE — 36415 COLL VENOUS BLD VENIPUNCTURE: CPT

## 2024-09-02 PROCEDURE — 6360000004 HC RX CONTRAST MEDICATION: Performed by: RADIOLOGY

## 2024-09-02 PROCEDURE — 2500000003 HC RX 250 WO HCPCS: Performed by: NURSE ANESTHETIST, CERTIFIED REGISTERED

## 2024-09-02 PROCEDURE — 99222 1ST HOSP IP/OBS MODERATE 55: CPT | Performed by: SURGERY

## 2024-09-02 PROCEDURE — 7100000001 HC PACU RECOVERY - ADDTL 15 MIN: Performed by: SURGERY

## 2024-09-02 PROCEDURE — 2580000003 HC RX 258: Performed by: PHYSICIAN ASSISTANT

## 2024-09-02 PROCEDURE — 3700000000 HC ANESTHESIA ATTENDED CARE: Performed by: SURGERY

## 2024-09-02 PROCEDURE — 99285 EMERGENCY DEPT VISIT HI MDM: CPT

## 2024-09-02 RX ORDER — LEVOFLOXACIN 5 MG/ML
750 INJECTION, SOLUTION INTRAVENOUS EVERY 24 HOURS
Status: DISCONTINUED | OUTPATIENT
Start: 2024-09-02 | End: 2024-09-05 | Stop reason: HOSPADM

## 2024-09-02 RX ORDER — 0.9 % SODIUM CHLORIDE 0.9 %
1000 INTRAVENOUS SOLUTION INTRAVENOUS ONCE
Status: COMPLETED | OUTPATIENT
Start: 2024-09-02 | End: 2024-09-02

## 2024-09-02 RX ORDER — METRONIDAZOLE 500 MG/100ML
500 INJECTION, SOLUTION INTRAVENOUS EVERY 8 HOURS
Status: DISCONTINUED | OUTPATIENT
Start: 2024-09-02 | End: 2024-09-05 | Stop reason: HOSPADM

## 2024-09-02 RX ORDER — IOPAMIDOL 755 MG/ML
100 INJECTION, SOLUTION INTRAVASCULAR
Status: COMPLETED | OUTPATIENT
Start: 2024-09-02 | End: 2024-09-02

## 2024-09-02 RX ADMIN — MIDAZOLAM 2 MG: 1 INJECTION INTRAMUSCULAR; INTRAVENOUS at 23:49

## 2024-09-02 RX ADMIN — PROPOFOL 120 MG: 10 INJECTION, EMULSION INTRAVENOUS at 23:58

## 2024-09-02 RX ADMIN — ROCURONIUM BROMIDE 10 MG: 10 INJECTION, SOLUTION INTRAVENOUS at 23:58

## 2024-09-02 RX ADMIN — LEVOFLOXACIN 750 MG: 750 INJECTION, SOLUTION INTRAVENOUS at 23:34

## 2024-09-02 RX ADMIN — LIDOCAINE HYDROCHLORIDE 50 MG: 20 INJECTION, SOLUTION EPIDURAL; INFILTRATION; INTRACAUDAL; PERINEURAL at 23:58

## 2024-09-02 RX ADMIN — IOPAMIDOL 80 ML: 755 INJECTION, SOLUTION INTRAVENOUS at 20:53

## 2024-09-02 RX ADMIN — FENTANYL CITRATE 50 MCG: 50 INJECTION, SOLUTION INTRAMUSCULAR; INTRAVENOUS at 23:57

## 2024-09-02 RX ADMIN — FENTANYL CITRATE 50 MCG: 50 INJECTION, SOLUTION INTRAMUSCULAR; INTRAVENOUS at 23:53

## 2024-09-02 RX ADMIN — SODIUM CHLORIDE 1000 ML: 9 INJECTION, SOLUTION INTRAVENOUS at 20:06

## 2024-09-02 RX ADMIN — SODIUM CHLORIDE, POTASSIUM CHLORIDE, SODIUM LACTATE AND CALCIUM CHLORIDE: 600; 310; 30; 20 INJECTION, SOLUTION INTRAVENOUS at 23:20

## 2024-09-02 RX ADMIN — Medication 100 MG: at 23:59

## 2024-09-02 ASSESSMENT — PAIN - FUNCTIONAL ASSESSMENT
PAIN_FUNCTIONAL_ASSESSMENT: NONE - DENIES PAIN
PAIN_FUNCTIONAL_ASSESSMENT: 0-10
PAIN_FUNCTIONAL_ASSESSMENT: ACTIVITIES ARE NOT PREVENTED
PAIN_FUNCTIONAL_ASSESSMENT: ACTIVITIES ARE NOT PREVENTED

## 2024-09-02 ASSESSMENT — PAIN SCALES - GENERAL
PAINLEVEL_OUTOF10: 2
PAINLEVEL_OUTOF10: 2

## 2024-09-02 ASSESSMENT — PAIN DESCRIPTION - ORIENTATION
ORIENTATION: RIGHT;MID;LOWER
ORIENTATION: RIGHT;MID;LOWER

## 2024-09-02 ASSESSMENT — PAIN DESCRIPTION - ONSET
ONSET: SUDDEN
ONSET: ON-GOING

## 2024-09-02 ASSESSMENT — PAIN DESCRIPTION - DESCRIPTORS
DESCRIPTORS: ACHING
DESCRIPTORS: ACHING

## 2024-09-02 ASSESSMENT — PAIN DESCRIPTION - LOCATION
LOCATION: ABDOMEN
LOCATION: ABDOMEN

## 2024-09-02 ASSESSMENT — PAIN DESCRIPTION - FREQUENCY
FREQUENCY: CONTINUOUS
FREQUENCY: CONTINUOUS

## 2024-09-02 ASSESSMENT — PAIN DESCRIPTION - PAIN TYPE
TYPE: ACUTE PAIN
TYPE: ACUTE PAIN

## 2024-09-02 ASSESSMENT — ENCOUNTER SYMPTOMS: ABDOMINAL PAIN: 1

## 2024-09-02 NOTE — ED TRIAGE NOTES
She has had lower right sided abdominal pain since Saturday with some nausea and she vomited last night. She was seen by a provider in triage.

## 2024-09-02 NOTE — ED PROVIDER NOTES
during her inpatient stay.    Amount and/or Complexity of Data Reviewed  Independent Historian:      Details: Significant other at bedside  Labs: ordered.  Radiology: ordered.    Risk  Prescription drug management.            REASSESSMENT            CONSULTS:  IP CONSULT TO GENERAL SURGERY  IP CONSULT TO SPIRITUAL SERVICES    PROCEDURES:  Unless otherwise noted below, none     Procedures      FINAL IMPRESSION    No diagnosis found.      DISPOSITION/PLAN   DISPOSITION        PATIENT REFERRED TO:  No follow-up provider specified.    DISCHARGE MEDICATIONS:  There are no discharge medications for this patient.        (Please note that portions of this note were completed with a voice recognition program.  Efforts were made to edit the dictations but occasionally words are mis-transcribed.)    YAHAIRA Marquis (electronically signed)  Emergency Attending Physician / Physician Assistant / Nurse Practitioner      Discussed with Dr. Trujillo, general surgery.  Will admit.  YAHAIRA Marquis  10:11PM       Nikki Gomes PA  09/03/24 0022

## 2024-09-03 PROBLEM — K35.30 ACUTE APPENDICITIS WITH LOCALIZED PERITONITIS, WITHOUT PERFORATION, ABSCESS, OR GANGRENE: Status: ACTIVE | Noted: 2024-09-03

## 2024-09-03 PROCEDURE — 2580000003 HC RX 258: Performed by: SURGERY

## 2024-09-03 PROCEDURE — G0378 HOSPITAL OBSERVATION PER HR: HCPCS

## 2024-09-03 PROCEDURE — 2500000003 HC RX 250 WO HCPCS: Performed by: NURSE ANESTHETIST, CERTIFIED REGISTERED

## 2024-09-03 PROCEDURE — 88304 TISSUE EXAM BY PATHOLOGIST: CPT

## 2024-09-03 PROCEDURE — 2500000003 HC RX 250 WO HCPCS: Performed by: SURGERY

## 2024-09-03 PROCEDURE — 6360000002 HC RX W HCPCS: Performed by: SURGERY

## 2024-09-03 PROCEDURE — 6360000002 HC RX W HCPCS: Performed by: NURSE ANESTHETIST, CERTIFIED REGISTERED

## 2024-09-03 PROCEDURE — 6360000002 HC RX W HCPCS

## 2024-09-03 PROCEDURE — 6360000002 HC RX W HCPCS: Performed by: ANESTHESIOLOGY

## 2024-09-03 PROCEDURE — 2580000003 HC RX 258

## 2024-09-03 PROCEDURE — 44970 LAPAROSCOPY APPENDECTOMY: CPT | Performed by: SURGERY

## 2024-09-03 PROCEDURE — 2580000003 HC RX 258: Performed by: NURSE ANESTHETIST, CERTIFIED REGISTERED

## 2024-09-03 RX ORDER — SODIUM CHLORIDE 0.9 % (FLUSH) 0.9 %
5-40 SYRINGE (ML) INJECTION PRN
Status: DISCONTINUED | OUTPATIENT
Start: 2024-09-03 | End: 2024-09-05 | Stop reason: HOSPADM

## 2024-09-03 RX ORDER — PROPOFOL 10 MG/ML
INJECTION, EMULSION INTRAVENOUS PRN
Status: DISCONTINUED | OUTPATIENT
Start: 2024-09-02 | End: 2024-09-03 | Stop reason: SDUPTHER

## 2024-09-03 RX ORDER — KETOROLAC TROMETHAMINE 30 MG/ML
15 INJECTION, SOLUTION INTRAMUSCULAR; INTRAVENOUS EVERY 6 HOURS
Status: DISCONTINUED | OUTPATIENT
Start: 2024-09-03 | End: 2024-09-04

## 2024-09-03 RX ORDER — OXYCODONE AND ACETAMINOPHEN 5; 325 MG/1; MG/1
1-2 TABLET ORAL EVERY 6 HOURS PRN
Qty: 20 TABLET | Refills: 0 | Status: SHIPPED | OUTPATIENT
Start: 2024-09-03 | End: 2024-09-07

## 2024-09-03 RX ORDER — SODIUM CHLORIDE, SODIUM LACTATE, POTASSIUM CHLORIDE, CALCIUM CHLORIDE 600; 310; 30; 20 MG/100ML; MG/100ML; MG/100ML; MG/100ML
INJECTION, SOLUTION INTRAVENOUS CONTINUOUS
Status: DISCONTINUED | OUTPATIENT
Start: 2024-09-03 | End: 2024-09-05 | Stop reason: HOSPADM

## 2024-09-03 RX ORDER — NALOXONE HYDROCHLORIDE 0.4 MG/ML
INJECTION, SOLUTION INTRAMUSCULAR; INTRAVENOUS; SUBCUTANEOUS PRN
Status: DISCONTINUED | OUTPATIENT
Start: 2024-09-03 | End: 2024-09-03 | Stop reason: HOSPADM

## 2024-09-03 RX ORDER — OXYCODONE HYDROCHLORIDE 5 MG/1
5 TABLET ORAL
Status: DISCONTINUED | OUTPATIENT
Start: 2024-09-03 | End: 2024-09-03 | Stop reason: HOSPADM

## 2024-09-03 RX ORDER — MAGNESIUM SULFATE IN WATER 40 MG/ML
2000 INJECTION, SOLUTION INTRAVENOUS PRN
Status: DISCONTINUED | OUTPATIENT
Start: 2024-09-03 | End: 2024-09-05 | Stop reason: HOSPADM

## 2024-09-03 RX ORDER — MIDAZOLAM HYDROCHLORIDE 1 MG/ML
INJECTION INTRAMUSCULAR; INTRAVENOUS PRN
Status: DISCONTINUED | OUTPATIENT
Start: 2024-09-02 | End: 2024-09-03 | Stop reason: SDUPTHER

## 2024-09-03 RX ORDER — OXYCODONE HYDROCHLORIDE 5 MG/1
5 TABLET ORAL EVERY 4 HOURS PRN
Status: DISCONTINUED | OUTPATIENT
Start: 2024-09-03 | End: 2024-09-05 | Stop reason: HOSPADM

## 2024-09-03 RX ORDER — LIDOCAINE HYDROCHLORIDE 20 MG/ML
INJECTION, SOLUTION EPIDURAL; INFILTRATION; INTRACAUDAL; PERINEURAL PRN
Status: DISCONTINUED | OUTPATIENT
Start: 2024-09-02 | End: 2024-09-03 | Stop reason: SDUPTHER

## 2024-09-03 RX ORDER — SODIUM CHLORIDE 9 MG/ML
INJECTION, SOLUTION INTRAVENOUS PRN
Status: DISCONTINUED | OUTPATIENT
Start: 2024-09-03 | End: 2024-09-05 | Stop reason: HOSPADM

## 2024-09-03 RX ORDER — ONDANSETRON 2 MG/ML
4 INJECTION INTRAMUSCULAR; INTRAVENOUS
Status: COMPLETED | OUTPATIENT
Start: 2024-09-03 | End: 2024-09-03

## 2024-09-03 RX ORDER — ONDANSETRON 2 MG/ML
4 INJECTION INTRAMUSCULAR; INTRAVENOUS EVERY 6 HOURS PRN
Status: DISCONTINUED | OUTPATIENT
Start: 2024-09-03 | End: 2024-09-05 | Stop reason: HOSPADM

## 2024-09-03 RX ORDER — BUPIVACAINE HYDROCHLORIDE AND EPINEPHRINE 2.5; 5 MG/ML; UG/ML
INJECTION, SOLUTION EPIDURAL; INFILTRATION; INTRACAUDAL; PERINEURAL PRN
Status: DISCONTINUED | OUTPATIENT
Start: 2024-09-03 | End: 2024-09-03 | Stop reason: HOSPADM

## 2024-09-03 RX ORDER — ACETAMINOPHEN 325 MG/1
650 TABLET ORAL EVERY 6 HOURS PRN
Status: DISCONTINUED | OUTPATIENT
Start: 2024-09-03 | End: 2024-09-05 | Stop reason: HOSPADM

## 2024-09-03 RX ORDER — ONDANSETRON 4 MG/1
4 TABLET, ORALLY DISINTEGRATING ORAL EVERY 8 HOURS PRN
Status: DISCONTINUED | OUTPATIENT
Start: 2024-09-03 | End: 2024-09-05 | Stop reason: HOSPADM

## 2024-09-03 RX ORDER — OXYCODONE HYDROCHLORIDE 5 MG/1
10 TABLET ORAL EVERY 4 HOURS PRN
Status: DISCONTINUED | OUTPATIENT
Start: 2024-09-03 | End: 2024-09-05 | Stop reason: HOSPADM

## 2024-09-03 RX ORDER — DIPHENHYDRAMINE HYDROCHLORIDE 50 MG/ML
12.5 INJECTION INTRAMUSCULAR; INTRAVENOUS EVERY 6 HOURS PRN
Status: DISCONTINUED | OUTPATIENT
Start: 2024-09-03 | End: 2024-09-05 | Stop reason: HOSPADM

## 2024-09-03 RX ORDER — PROCHLORPERAZINE EDISYLATE 5 MG/ML
5 INJECTION INTRAMUSCULAR; INTRAVENOUS
Status: COMPLETED | OUTPATIENT
Start: 2024-09-03 | End: 2024-09-03

## 2024-09-03 RX ORDER — PROCHLORPERAZINE EDISYLATE 5 MG/ML
5 INJECTION INTRAMUSCULAR; INTRAVENOUS EVERY 6 HOURS PRN
Status: DISCONTINUED | OUTPATIENT
Start: 2024-09-03 | End: 2024-09-05 | Stop reason: HOSPADM

## 2024-09-03 RX ORDER — FENTANYL CITRATE 50 UG/ML
25 INJECTION, SOLUTION INTRAMUSCULAR; INTRAVENOUS EVERY 5 MIN PRN
Status: DISCONTINUED | OUTPATIENT
Start: 2024-09-03 | End: 2024-09-03 | Stop reason: HOSPADM

## 2024-09-03 RX ORDER — FENTANYL CITRATE 50 UG/ML
INJECTION, SOLUTION INTRAMUSCULAR; INTRAVENOUS PRN
Status: DISCONTINUED | OUTPATIENT
Start: 2024-09-02 | End: 2024-09-03 | Stop reason: SDUPTHER

## 2024-09-03 RX ORDER — SODIUM CHLORIDE 0.9 % (FLUSH) 0.9 %
5-40 SYRINGE (ML) INJECTION EVERY 12 HOURS SCHEDULED
Status: DISCONTINUED | OUTPATIENT
Start: 2024-09-03 | End: 2024-09-05 | Stop reason: HOSPADM

## 2024-09-03 RX ORDER — ONDANSETRON 2 MG/ML
INJECTION INTRAMUSCULAR; INTRAVENOUS PRN
Status: DISCONTINUED | OUTPATIENT
Start: 2024-09-03 | End: 2024-09-03 | Stop reason: SDUPTHER

## 2024-09-03 RX ORDER — SODIUM CHLORIDE 0.9 % (FLUSH) 0.9 %
5-40 SYRINGE (ML) INJECTION PRN
Status: DISCONTINUED | OUTPATIENT
Start: 2024-09-03 | End: 2024-09-03 | Stop reason: HOSPADM

## 2024-09-03 RX ORDER — HYDRALAZINE HYDROCHLORIDE 20 MG/ML
10 INJECTION INTRAMUSCULAR; INTRAVENOUS ONCE
Status: DISCONTINUED | OUTPATIENT
Start: 2024-09-03 | End: 2024-09-03 | Stop reason: HOSPADM

## 2024-09-03 RX ORDER — SODIUM CHLORIDE 0.9 % (FLUSH) 0.9 %
5-40 SYRINGE (ML) INJECTION EVERY 12 HOURS SCHEDULED
Status: DISCONTINUED | OUTPATIENT
Start: 2024-09-03 | End: 2024-09-03 | Stop reason: HOSPADM

## 2024-09-03 RX ORDER — POTASSIUM CHLORIDE 750 MG/1
40 TABLET, EXTENDED RELEASE ORAL PRN
Status: DISCONTINUED | OUTPATIENT
Start: 2024-09-03 | End: 2024-09-05 | Stop reason: HOSPADM

## 2024-09-03 RX ORDER — POTASSIUM CHLORIDE 7.45 MG/ML
10 INJECTION INTRAVENOUS PRN
Status: DISCONTINUED | OUTPATIENT
Start: 2024-09-03 | End: 2024-09-05 | Stop reason: HOSPADM

## 2024-09-03 RX ORDER — DEXAMETHASONE SODIUM PHOSPHATE 4 MG/ML
INJECTION, SOLUTION INTRA-ARTICULAR; INTRALESIONAL; INTRAMUSCULAR; INTRAVENOUS; SOFT TISSUE PRN
Status: DISCONTINUED | OUTPATIENT
Start: 2024-09-03 | End: 2024-09-03 | Stop reason: SDUPTHER

## 2024-09-03 RX ORDER — ENOXAPARIN SODIUM 100 MG/ML
40 INJECTION SUBCUTANEOUS DAILY
Status: DISCONTINUED | OUTPATIENT
Start: 2024-09-03 | End: 2024-09-05 | Stop reason: HOSPADM

## 2024-09-03 RX ORDER — SODIUM CHLORIDE, SODIUM LACTATE, POTASSIUM CHLORIDE, CALCIUM CHLORIDE 600; 310; 30; 20 MG/100ML; MG/100ML; MG/100ML; MG/100ML
INJECTION, SOLUTION INTRAVENOUS CONTINUOUS PRN
Status: DISCONTINUED | OUTPATIENT
Start: 2024-09-02 | End: 2024-09-03 | Stop reason: SDUPTHER

## 2024-09-03 RX ORDER — HYDROMORPHONE HYDROCHLORIDE 1 MG/ML
1 INJECTION, SOLUTION INTRAMUSCULAR; INTRAVENOUS; SUBCUTANEOUS
Status: DISCONTINUED | OUTPATIENT
Start: 2024-09-03 | End: 2024-09-04

## 2024-09-03 RX ORDER — SODIUM CHLORIDE 9 MG/ML
INJECTION, SOLUTION INTRAVENOUS PRN
Status: DISCONTINUED | OUTPATIENT
Start: 2024-09-03 | End: 2024-09-03 | Stop reason: HOSPADM

## 2024-09-03 RX ORDER — SUCCINYLCHOLINE/SOD CL,ISO/PF 200MG/10ML
SYRINGE (ML) INTRAVENOUS PRN
Status: DISCONTINUED | OUTPATIENT
Start: 2024-09-02 | End: 2024-09-03 | Stop reason: SDUPTHER

## 2024-09-03 RX ORDER — ROCURONIUM BROMIDE 10 MG/ML
INJECTION, SOLUTION INTRAVENOUS PRN
Status: DISCONTINUED | OUTPATIENT
Start: 2024-09-02 | End: 2024-09-03 | Stop reason: SDUPTHER

## 2024-09-03 RX ORDER — HYDROMORPHONE HYDROCHLORIDE 1 MG/ML
0.5 INJECTION, SOLUTION INTRAMUSCULAR; INTRAVENOUS; SUBCUTANEOUS
Status: DISCONTINUED | OUTPATIENT
Start: 2024-09-03 | End: 2024-09-03 | Stop reason: CLARIF

## 2024-09-03 RX ADMIN — SODIUM CHLORIDE, PRESERVATIVE FREE 10 ML: 5 INJECTION INTRAVENOUS at 09:18

## 2024-09-03 RX ADMIN — HYDROMORPHONE HYDROCHLORIDE 1 MG: 1 INJECTION, SOLUTION INTRAMUSCULAR; INTRAVENOUS; SUBCUTANEOUS at 00:40

## 2024-09-03 RX ADMIN — SODIUM CHLORIDE, POTASSIUM CHLORIDE, SODIUM LACTATE AND CALCIUM CHLORIDE: 600; 310; 30; 20 INJECTION, SOLUTION INTRAVENOUS at 20:05

## 2024-09-03 RX ADMIN — PROCHLORPERAZINE EDISYLATE 5 MG: 5 INJECTION INTRAMUSCULAR; INTRAVENOUS at 01:53

## 2024-09-03 RX ADMIN — ONDANSETRON 4 MG: 2 INJECTION INTRAMUSCULAR; INTRAVENOUS at 00:07

## 2024-09-03 RX ADMIN — PROCHLORPERAZINE EDISYLATE 5 MG: 5 INJECTION INTRAMUSCULAR; INTRAVENOUS at 17:20

## 2024-09-03 RX ADMIN — KETOROLAC TROMETHAMINE 15 MG: 30 INJECTION, SOLUTION INTRAMUSCULAR at 21:13

## 2024-09-03 RX ADMIN — KETOROLAC TROMETHAMINE 15 MG: 30 INJECTION, SOLUTION INTRAMUSCULAR at 15:41

## 2024-09-03 RX ADMIN — ROCURONIUM BROMIDE 20 MG: 10 INJECTION, SOLUTION INTRAVENOUS at 00:09

## 2024-09-03 RX ADMIN — ONDANSETRON 4 MG: 2 INJECTION INTRAMUSCULAR; INTRAVENOUS at 07:21

## 2024-09-03 RX ADMIN — SODIUM CHLORIDE, POTASSIUM CHLORIDE, SODIUM LACTATE AND CALCIUM CHLORIDE: 600; 310; 30; 20 INJECTION, SOLUTION INTRAVENOUS at 08:58

## 2024-09-03 RX ADMIN — ONDANSETRON 4 MG: 2 INJECTION INTRAMUSCULAR; INTRAVENOUS at 01:42

## 2024-09-03 RX ADMIN — SODIUM CHLORIDE, POTASSIUM CHLORIDE, SODIUM LACTATE AND CALCIUM CHLORIDE: 600; 310; 30; 20 INJECTION, SOLUTION INTRAVENOUS at 01:33

## 2024-09-03 RX ADMIN — METRONIDAZOLE 500 MG: 5 INJECTION, SOLUTION INTRAVENOUS at 15:54

## 2024-09-03 RX ADMIN — SUGAMMADEX 150 MG: 100 INJECTION, SOLUTION INTRAVENOUS at 01:00

## 2024-09-03 RX ADMIN — PROCHLORPERAZINE EDISYLATE 5 MG: 5 INJECTION INTRAMUSCULAR; INTRAVENOUS at 09:23

## 2024-09-03 RX ADMIN — LEVOFLOXACIN 750 MG: 750 INJECTION, SOLUTION INTRAVENOUS at 22:35

## 2024-09-03 RX ADMIN — METRONIDAZOLE 500 MG: 5 INJECTION, SOLUTION INTRAVENOUS at 07:15

## 2024-09-03 RX ADMIN — METRONIDAZOLE 500 MG: 5 INJECTION, SOLUTION INTRAVENOUS at 00:05

## 2024-09-03 RX ADMIN — ONDANSETRON 4 MG: 2 INJECTION INTRAMUSCULAR; INTRAVENOUS at 15:48

## 2024-09-03 RX ADMIN — KETOROLAC TROMETHAMINE 15 MG: 30 INJECTION, SOLUTION INTRAMUSCULAR at 09:12

## 2024-09-03 RX ADMIN — SODIUM CHLORIDE, PRESERVATIVE FREE 20 MG: 5 INJECTION INTRAVENOUS at 21:10

## 2024-09-03 RX ADMIN — DEXAMETHASONE SODIUM PHOSPHATE 4 MG: 4 INJECTION INTRA-ARTICULAR; INTRALESIONAL; INTRAMUSCULAR; INTRAVENOUS; SOFT TISSUE at 00:07

## 2024-09-03 RX ADMIN — SODIUM CHLORIDE, PRESERVATIVE FREE 40 MG: 5 INJECTION INTRAVENOUS at 09:18

## 2024-09-03 RX ADMIN — KETOROLAC TROMETHAMINE 15 MG: 30 INJECTION, SOLUTION INTRAMUSCULAR at 02:37

## 2024-09-03 RX ADMIN — SODIUM CHLORIDE, POTASSIUM CHLORIDE, SODIUM LACTATE AND CALCIUM CHLORIDE: 600; 310; 30; 20 INJECTION, SOLUTION INTRAVENOUS at 00:59

## 2024-09-03 ASSESSMENT — PAIN SCALES - GENERAL
PAINLEVEL_OUTOF10: 2
PAINLEVEL_OUTOF10: 0

## 2024-09-03 ASSESSMENT — PAIN - FUNCTIONAL ASSESSMENT: PAIN_FUNCTIONAL_ASSESSMENT: NONE - DENIES PAIN

## 2024-09-03 ASSESSMENT — PAIN DESCRIPTION - DESCRIPTORS: DESCRIPTORS: DISCOMFORT

## 2024-09-03 NOTE — DISCHARGE INSTRUCTIONS
No heavy lifting more than 10-15 lbs  May remove outer dressings in 3 days  Leave the steri-strips in place  May shower in 2 days  No tub baths or swimming.   May walk and climb stairs.     Make appt with GI provider for reflux symptoms

## 2024-09-03 NOTE — OP NOTE
Operative Note      Patient: Elma Vivas  YOB: 1950  MRN: [id number]    Date of Procedure: 9/2/2024    Preoperative diagnosis  Acute appendicitis and cecal mass    Post-Op Diagnosis: Same       Procedure(s):  APPENDECTOMY LAPAROSCOPIC    Surgeon(s):  Alfredo Trujillo MD    Assistant:   Surgical Assistant: Carlos Winchester    Anesthesia: General    Estimated Blood Loss (mL): Minimal    Complications: None    Specimens:   ID Type Source Tests Collected by Time Destination   1 : Appendix Tissue Appendix SURGICAL PATHOLOGY Alfredo Trujillo MD 9/3/2024 0042        Implants:  * No implants in log *      Drains:   Urinary Catheter 09/03/24 Mendez (Active)       Findings:  Infection Present At Time Of Surgery (PATOS) (choose all levels that have infection present):  No infection present acute appendicitis  Other Findings: Acute appendicitis with possible mass at the appendiceal orifice    Detailed Description of Procedure:   Patient was brought to the operating room placed in the operative table supine position.  General endotracheal anesthesia was then established.  She was then prepped and draped in usual sterile fashion.  5 mm infraumbilical incision was then made.  Veress needle was placed within the abdomen and saline drop test was performed.  Once were assured we are within the abdomen, the abdomen was insufflated to 15 mmHg. Veress needle was removed and a 5 mm optiview trocar was then placed.  An additional 5 mm and 1-12 mm trocar were then placed through direct visualization.  The appendix was identified .  The appendix was noted to be thickened all the way to the base.  At the base of the appendix there was focal thickening just proximal to the appendiceal orifice.  This was separate from the terminal ileum.  The appendix was then grabbed and the mesentery of the appendix was partially  resected using the sonocision.  a window was made at the base of the appendix encompassing the distal

## 2024-09-03 NOTE — ED NOTES
While assessing pt she stated that she has felt very sad about loss of daughter and would like consult to . YAHAIRA Jordan was made aware.

## 2024-09-03 NOTE — ANESTHESIA POSTPROCEDURE EVALUATION
Department of Anesthesiology  Postprocedure Note    Patient: Elma Vivas  MRN: [id number]  YOB: 1950  Date of evaluation: 9/3/2024    Procedure Summary       Date: 09/02/24 Room / Location: [facility] MAIN OR [address] [place] [address] MAIN OR    Anesthesia Start: 2349 Anesthesia Stop: 09/03/24 0115    Procedure: APPENDECTOMY LAPAROSCOPIC (Abdomen) Diagnosis:       Acute appendicitis, unspecified acute appendicitis type      (Acute appendicitis, unspecified acute appendicitis type [K35.80])    Providers: Alfredo Trujillo MD Responsible Provider: Gil Macario DO    Anesthesia Type: General ASA Status: 3            Anesthesia Type: General    Gaviota Phase I: Gaviota Score: 7    Gaviota Phase II:      Anesthesia Post Evaluation    Patient location during evaluation: PACU  Patient participation: complete - patient participated  Level of consciousness: awake  Pain score: 0  Nausea & Vomiting: no nausea  Cardiovascular status: hemodynamically stable  Respiratory status: acceptable  Hydration status: euvolemic  Comments: BP: 121/63 Pulse: 86  Resp: 17 SpO2: 100  Temp: 97.6 °F (36.4 °C)      Pain management: adequate    No notable events documented.

## 2024-09-03 NOTE — ANESTHESIA POSTPROCEDURE EVALUATION
Department of Anesthesiology  Postprocedure Note    Patient: Elma Vivas  MRN: [id number]  YOB: 1950  Date of evaluation: 9/3/2024    Procedure Summary       Date: 09/02/24 Room / Location: [facility] MAIN OR [address] [place] [address] MAIN OR    Anesthesia Start: 2349 Anesthesia Stop: 09/03/24 0115    Procedure: APPENDECTOMY LAPAROSCOPIC (Abdomen) Diagnosis:       Acute appendicitis, unspecified acute appendicitis type      (Acute appendicitis, unspecified acute appendicitis type [K35.80])    Providers: Alfredo Trujillo MD Responsible Provider: Gil Macario DO    Anesthesia Type: General ASA Status: 3            Anesthesia Type: General    Gaviota Phase I: Gaviota Score: 10    Gaviota Phase II:      Anesthesia Post Evaluation    Patient location during evaluation: PACU  Patient participation: complete - patient participated  Level of consciousness: awake  Pain score: 0  Airway patency: patent  Nausea & Vomiting: no nausea  Cardiovascular status: hemodynamically stable  Respiratory status: acceptable  Hydration status: euvolemic  Comments: BP: 121/63 Pulse: 86  Resp: 17 SpO2: 100  Temp: 97.6 °F (36.4 °C)      Pain management: adequate    No notable events documented.

## 2024-09-03 NOTE — ED NOTES
Pt just informed me she gets a rash when she takes penicillin and has an unknown reaction to Keflex.

## 2024-09-03 NOTE — H&P
General Surgery History and Physical             Date:2024        Patient Name:Elma Vivas     YOB: 1950     Age:74 y.o.            History of Present Illness   The patient is a 74-year-old female who began having abdominal pain approximately 2 to 3 days ago. she did have associated nausea and vomiting.  She states that the pain initially got better.  She states the pain moved down to her right lower quadrant.  Complains of pain when she moves.  Pain is somewhat generalized and burning.  She has never had a colonoscopy  Past Medical History     Past Medical History:   Diagnosis Date    GERD (gastroesophageal reflux disease)     Osteoporosis     Post concussion syndrome         Past Surgical History     Past Surgical History:   Procedure Laterality Date    DELIVERY       FINGER SURGERY          Medications     Prior to Admission medications    Not on File        metroNIDAZOLE (FLAGYL) 500 mg in 0.9% NaCl 100 mL IVPB premix, Q8H  levoFLOXacin (LEVAQUIN) 750 MG/150ML infusion 750 mg, Q24H        Allergies   Keflex [cephalexin], Sulfa antibiotics, and Penicillins    Social History     Social History       Tobacco History       Smoking Status  Never      Smokeless Tobacco Use  Never              Alcohol History       Alcohol Use Status  Yes              Drug Use       Drug Use Status  Not Asked              Sexual Activity       Sexually Active  Not Asked                    Family History     Family History   Problem Relation Age of Onset    No Known Problems Father         cad       Review of Systems   Negative except as noted in HPI    Physical Exam   /72   Pulse 85   Temp 98.6 °F (37 °C) (Oral)   Resp 16   Wt 53.5 kg (117 lb 15.1 oz)   SpO2 99%     CONSTITUTIONAL: Alert and oriented in no acute distress  EYES: Nonicteric  ENT: Normocephalic/atraumatic  NECK: Supple  LUNGS: Clear bilaterally  CARDIOVASCULAR: Regular rate and rhythm  ABDOMEN: Soft with tenderness in the

## 2024-09-03 NOTE — ANESTHESIA PRE PROCEDURE
No evidence of microperforation or appendiceal  abscess.  PERITONEUM: No ascites or pneumoperitoneum.  RETROPERITONEUM: No lymphadenopathy or aortic aneurysm.  REPRODUCTIVE ORGANS: Retroverted myomatous uterus with partially calcified  fibroid measuring up to 1.8 cm.  URINARY BLADDER: No mass or calculus.  BONES: Nonspecific mild diffuse bladder wall thickening measuring up to 0.7 cm,  may be due to under distention. No destructive bone lesion.  ABDOMINAL WALL: No mass or hernia.  ADDITIONAL COMMENTS: N/A     IMPRESSION:  Findings suggestive of acute appendicitis as detailed. Associated 1.1 x 1.1 cm  nodular cecal mucosal lesion along its orifice within the cecum may suggest an  obstructing mass lesion; mucosal inflammatory lesion not completely excluded.  Clinical correlation and continued attention on follow-up advised.  .   Endo/Other:    (+) : arthritis:..                 Abdominal:             Vascular: negative vascular ROS.         Other Findings:         Anesthesia Plan      general     ASA 3       Induction: intravenous.    MIPS: Postoperative opioids intended and Prophylactic antiemetics administered.  Anesthetic plan and risks discussed with patient.      Plan discussed with CRNA.    Attending anesthesiologist reviewed and agrees with Preprocedure content              Gil Macario DO   9/2/2024

## 2024-09-03 NOTE — PERIOP NOTE
TRANSFER - IN REPORT:    Verbal report received from Ernestian Hernandez on Elma Vivas  being received from ED35 for ordered procedure      Report consisted of patient's Situation, Background, Assessment and   Recommendations(SBAR).     Information from the following report(s) ED Encounter Summary was reviewed with the receiving nurse.    Opportunity for questions and clarification was provided.      Assessment completed upon patient's arrival to unit and care assumed.

## 2024-09-04 PROCEDURE — G0378 HOSPITAL OBSERVATION PER HR: HCPCS

## 2024-09-04 PROCEDURE — 2500000003 HC RX 250 WO HCPCS: Performed by: SURGERY

## 2024-09-04 PROCEDURE — 2580000003 HC RX 258

## 2024-09-04 PROCEDURE — 6360000002 HC RX W HCPCS: Performed by: SURGERY

## 2024-09-04 PROCEDURE — 6360000002 HC RX W HCPCS

## 2024-09-04 PROCEDURE — 99024 POSTOP FOLLOW-UP VISIT: CPT

## 2024-09-04 PROCEDURE — 2580000003 HC RX 258: Performed by: SURGERY

## 2024-09-04 RX ADMIN — LEVOFLOXACIN 750 MG: 750 INJECTION, SOLUTION INTRAVENOUS at 21:26

## 2024-09-04 RX ADMIN — PROCHLORPERAZINE EDISYLATE 5 MG: 5 INJECTION INTRAMUSCULAR; INTRAVENOUS at 07:27

## 2024-09-04 RX ADMIN — PROCHLORPERAZINE EDISYLATE 5 MG: 5 INJECTION INTRAMUSCULAR; INTRAVENOUS at 00:36

## 2024-09-04 RX ADMIN — KETOROLAC TROMETHAMINE 15 MG: 30 INJECTION, SOLUTION INTRAMUSCULAR at 03:25

## 2024-09-04 RX ADMIN — METRONIDAZOLE 500 MG: 5 INJECTION, SOLUTION INTRAVENOUS at 17:43

## 2024-09-04 RX ADMIN — SODIUM CHLORIDE, PRESERVATIVE FREE 40 MG: 5 INJECTION INTRAVENOUS at 09:15

## 2024-09-04 RX ADMIN — KETOROLAC TROMETHAMINE 15 MG: 30 INJECTION, SOLUTION INTRAMUSCULAR at 09:15

## 2024-09-04 RX ADMIN — METRONIDAZOLE 500 MG: 5 INJECTION, SOLUTION INTRAVENOUS at 00:29

## 2024-09-04 RX ADMIN — SODIUM CHLORIDE, POTASSIUM CHLORIDE, SODIUM LACTATE AND CALCIUM CHLORIDE: 600; 310; 30; 20 INJECTION, SOLUTION INTRAVENOUS at 17:43

## 2024-09-04 RX ADMIN — METRONIDAZOLE 500 MG: 5 INJECTION, SOLUTION INTRAVENOUS at 09:35

## 2024-09-04 RX ADMIN — SODIUM CHLORIDE, PRESERVATIVE FREE 20 MG: 5 INJECTION INTRAVENOUS at 09:15

## 2024-09-04 RX ADMIN — METRONIDAZOLE 500 MG: 5 INJECTION, SOLUTION INTRAVENOUS at 23:13

## 2024-09-04 RX ADMIN — SODIUM CHLORIDE, PRESERVATIVE FREE 20 MG: 5 INJECTION INTRAVENOUS at 21:23

## 2024-09-04 ASSESSMENT — PAIN SCALES - GENERAL: PAINLEVEL_OUTOF10: 2

## 2024-09-04 NOTE — FLOWSHEET NOTE
09/04/24 0003   Handoff   Communication Given Other   Handoff Given To Kelly ARTIS   Handoff Received From Areli ARTIS   Handoff Communication Face to Face   Time Handoff Given 6110

## 2024-09-05 VITALS
RESPIRATION RATE: 16 BRPM | HEIGHT: 64 IN | SYSTOLIC BLOOD PRESSURE: 146 MMHG | BODY MASS INDEX: 20.14 KG/M2 | TEMPERATURE: 98.2 F | DIASTOLIC BLOOD PRESSURE: 85 MMHG | WEIGHT: 117.95 LBS | HEART RATE: 85 BPM | OXYGEN SATURATION: 94 %

## 2024-09-05 PROCEDURE — 6360000002 HC RX W HCPCS

## 2024-09-05 PROCEDURE — 6360000002 HC RX W HCPCS: Performed by: SURGERY

## 2024-09-05 PROCEDURE — 2580000003 HC RX 258

## 2024-09-05 PROCEDURE — 2500000003 HC RX 250 WO HCPCS: Performed by: SURGERY

## 2024-09-05 PROCEDURE — G0378 HOSPITAL OBSERVATION PER HR: HCPCS

## 2024-09-05 PROCEDURE — 99024 POSTOP FOLLOW-UP VISIT: CPT

## 2024-09-05 PROCEDURE — 2580000003 HC RX 258: Performed by: SURGERY

## 2024-09-05 RX ORDER — OMEPRAZOLE 40 MG/1
40 CAPSULE, DELAYED RELEASE ORAL
Qty: 90 CAPSULE | Refills: 1 | Status: SHIPPED | OUTPATIENT
Start: 2024-09-05

## 2024-09-05 RX ADMIN — SODIUM CHLORIDE, PRESERVATIVE FREE 40 MG: 5 INJECTION INTRAVENOUS at 09:43

## 2024-09-05 RX ADMIN — SODIUM CHLORIDE, POTASSIUM CHLORIDE, SODIUM LACTATE AND CALCIUM CHLORIDE: 600; 310; 30; 20 INJECTION, SOLUTION INTRAVENOUS at 06:32

## 2024-09-05 RX ADMIN — METRONIDAZOLE 500 MG: 5 INJECTION, SOLUTION INTRAVENOUS at 06:33

## 2024-09-05 RX ADMIN — SODIUM CHLORIDE, PRESERVATIVE FREE 20 MG: 5 INJECTION INTRAVENOUS at 09:43

## 2024-09-05 NOTE — DISCHARGE SUMMARY
Physician Discharge Summary     Patient ID:  Elma Vivas  [id number]  74 y.o.  1950    Admit Date: 9/2/2024    Discharge Date: 9/5/2024    Admission Diagnoses: Acute appendicitis with localized peritonitis, without perforation, abscess, or gangrene [K35.30]    Discharge Diagnoses:  Principal Problem:    Acute appendicitis with localized peritonitis, without perforation, abscess, or gangrene  Resolved Problems:    * No resolved hospital problems. *       Admission Condition: Serious    Discharge Condition: Good    Last Procedure: Procedure(s):  APPENDECTOMY LAPAROSCOPIC      Hospital Course:   Pt admitted to hospital with acute appendicitis and obstructing cecal mass.  She was take to OR for laparoscopic appendectomy.  Pt was admitted to hospital postoperatively.  She initially had postop nausea, however, after stopping narcotics nausea resolved and pain was well managed without any pain medication.  She c/o ongoing reflux and was referred to GI provider and given PPI upon discharge. Pt was discharged home on POD#3 with follow up appt in office.    Consults: None    Disposition: home    Patient Instructions:   Current Discharge Medication List        START taking these medications    Details   omeprazole (PRILOSEC) 40 MG delayed release capsule Take 1 capsule by mouth every morning (before breakfast)  Qty: 90 capsule, Refills: 1      oxyCODONE-acetaminophen (PERCOCET) 5-325 MG per tablet Take 1-2 tablets by mouth every 6 hours as needed for Pain for up to 4 days. Intended supply: 3 days. Take lowest dose possible to manage pain Max Daily Amount: 8 tablets  Qty: 20 tablet, Refills: 0    Comments: Reduce doses taken as pain becomes manageable  Associated Diagnoses: Acute appendicitis with localized peritonitis, without perforation, abscess, or gangrene           Activity: No lifting, pushing or pulling anything heavier than 10 lbs x 2 weeks. Walking as tolerated.  No driving while you are taking

## 2024-09-05 NOTE — PROGRESS NOTES
General Surgery Daily Progress Note    Admit Date: 2024  Post-Operative Day: 1 Day Post-Op from Procedure(s):  APPENDECTOMY LAPAROSCOPIC     Subjective:     Last 24 hrs: pt c/o continued n/v this morning.  Has last dose of zofran about an hour ago.  States pain is well managed at this time.  Has not been OOB yet.  No flatus yet        Objective:     Blood pressure 116/71, pulse 78, temperature 97.5 °F (36.4 °C), temperature source Oral, resp. rate 18, height 1.626 m (5' 4\"), weight 53.5 kg (117 lb 15.1 oz), SpO2 100%.  Temp (24hrs), Av.9 °F (36.6 °C), Min:97 °F (36.1 °C), Max:98.7 °F (37.1 °C)      _____________________  Physical Exam:     Alert and Oriented x 3, in no acute distress.  Cardiovascular: RRR, S1S2  Lungs:CTAB; unlabored  Abdomen: soft, mild distention, appropriate incisional TTP. no guarding or rebound.  +BS      Data Review:    Recent Labs     24   WBC 6.4   HGB 12.7   HCT 38.8        Recent Labs     24      K 3.6      CO2 28   BUN 10   ALT 20     Invalid input(s): \"AML\", \"LPSE\"        ______________________  Medications:    Current Facility-Administered Medications   Medication Dose Route Frequency    sodium chloride flush 0.9 % injection 5-40 mL  5-40 mL IntraVENous 2 times per day    sodium chloride flush 0.9 % injection 5-40 mL  5-40 mL IntraVENous PRN    0.9 % sodium chloride infusion   IntraVENous PRN    potassium chloride (KLOR-CON) extended release tablet 40 mEq  40 mEq Oral PRN    Or    potassium bicarb-citric acid (EFFER-K) effervescent tablet 40 mEq  40 mEq Oral PRN    Or    potassium chloride 10 mEq/100 mL IVPB (Peripheral Line)  10 mEq IntraVENous PRN    magnesium sulfate 2000 mg in 50 mL IVPB premix  2,000 mg IntraVENous PRN    ondansetron (ZOFRAN-ODT) disintegrating tablet 4 mg  4 mg Oral Q8H PRN    Or    ondansetron (ZOFRAN) injection 4 mg  4 mg IntraVENous Q6H PRN    enoxaparin (LOVENOX) injection 40 mg  40 mg SubCUTAneous Daily    
General Surgery Daily Progress Note    Admit Date: 2024  Post-Operative Day: 3 Days Post-Op from Procedure(s):  APPENDECTOMY LAPAROSCOPIC     Subjective:     Last 24 hrs: pt sitting up in bed.  States she is feeling much better today.  Was able to sleep through the night. Tolerating regular diet. Denies any nausea or vomiting. Has not needed antiemetics or analgesics overnight.  +flatus and reports a large non bloody bowel movement this am.  States she feels ready to d/c home.  Requesting GI referral as OP for ongoing reflux symptoms that she has been experiencing prior to this admission.         Objective:     Blood pressure (!) 146/85, pulse 85, temperature 98.2 °F (36.8 °C), temperature source Oral, resp. rate 16, height 1.626 m (5' 4\"), weight 53.5 kg (117 lb 15.1 oz), SpO2 94%.  Temp (24hrs), Av.6 °F (37 °C), Min:98.2 °F (36.8 °C), Max:99 °F (37.2 °C)      _____________________  Physical Exam:     Alert and Oriented x 3, in no acute distress.  Cardiovascular: RRR, S1S2  Lungs:unlabored  Abdomen: soft, mild distention, appropriate incisional TTP. no guarding or rebound. +BS, dressings removed and steri strips intact     Data Review:    Recent Labs     24   WBC 6.4   HGB 12.7   HCT 38.8        Recent Labs     24      K 3.6      CO2 28   BUN 10   ALT 20     Invalid input(s): \"AML\", \"LPSE\"        ______________________  Medications:    Current Facility-Administered Medications   Medication Dose Route Frequency    sodium chloride flush 0.9 % injection 5-40 mL  5-40 mL IntraVENous 2 times per day    sodium chloride flush 0.9 % injection 5-40 mL  5-40 mL IntraVENous PRN    0.9 % sodium chloride infusion   IntraVENous PRN    potassium chloride (KLOR-CON) extended release tablet 40 mEq  40 mEq Oral PRN    Or    potassium bicarb-citric acid (EFFER-K) effervescent tablet 40 mEq  40 mEq Oral PRN    Or    potassium chloride 10 mEq/100 mL IVPB (Peripheral Line)  10 mEq 
Spiritual Health Assessment/Progress Note  [facility]    Spiritual/Emotional Needs,  ,  ,      Name: Elma Vivas MRN: [id number]    Age: 74 y.o.     Sex: female   Language: English   [place]ism: Pentecostal   Acute appendicitis with localized peritonitis, without perforation, abscess, or gangrene     Date: 9/3/2024            Total Time Calculated: 45 min              Spiritual Assessment began in [facility] 5E2 SURGICAL UNIT        Referral/Consult From: Physician   Encounter Overview/Reason: Spiritual/Emotional Needs  Service Provided For: Patient and family together    [place], Belief, Meaning:   Patient is connected with a monserrat tradition or spiritual practice  Family/Friends are connected with a monserrat tradition or spiritual practice      Importance and Influence:  Patient has no beliefs influential to healthcare decision-making identified during this visit  Family/Friends have no beliefs influential to healthcare decision-making identified during this visit    Community:  Patient is connected with a spiritual community  Family/Friends are connected with a spiritual community:    Assessment and Plan of Care:     Patient Interventions include: Facilitated expression of thoughts and feelings  Family/Friends Interventions include: Affirmed coping skills/support systems    Patient Plan of Care: Spiritual Care available upon further referral  Family/Friends Plan of Care: Spiritual Care available upon further referral    Electronically signed by Donna Bowlinglain Resident on 9/3/2024 at 10:25 AM   
IntraVENous PRN    ondansetron (ZOFRAN-ODT) disintegrating tablet 4 mg  4 mg Oral Q8H PRN    Or    ondansetron (ZOFRAN) injection 4 mg  4 mg IntraVENous Q6H PRN    enoxaparin (LOVENOX) injection 40 mg  40 mg SubCUTAneous Daily    lactated ringers IV soln infusion   IntraVENous Continuous    acetaminophen (TYLENOL) tablet 650 mg  650 mg Oral Q6H PRN    oxyCODONE (ROXICODONE) immediate release tablet 5 mg  5 mg Oral Q4H PRN    Or    oxyCODONE (ROXICODONE) immediate release tablet 10 mg  10 mg Oral Q4H PRN    diphenhydrAMINE (BENADRYL) injection 12.5 mg  12.5 mg IntraVENous Q6H PRN    prochlorperazine (COMPAZINE) injection 5 mg  5 mg IntraVENous Q6H PRN    pantoprazole (PROTONIX) 40 mg in sodium chloride (PF) 0.9 % 10 mL injection  40 mg IntraVENous Daily    famotidine (PEPCID) 20 mg in sodium chloride (PF) 0.9 % 10 mL injection  20 mg IntraVENous BID    metroNIDAZOLE (FLAGYL) 500 mg in 0.9% NaCl 100 mL IVPB premix  500 mg IntraVENous Q8H    levoFLOXacin (LEVAQUIN) 750 MG/150ML infusion 750 mg  750 mg IntraVENous Q24H               Assessment:   Principal Problem:    Acute appendicitis with localized peritonitis, without perforation, abscess, or gangrene  Resolved Problems:    * No resolved hospital problems. *          Plan:     Discontinued IV pain meds including Toradol  Analgesics and antiemetics PRN   Advance to low fiber diet as tolerated.  Told family they may bring food from home.  Encourage OOB and ambulation  GI/DVT proph   Will recheck on patient this afternoon to see if she is tolerating PO intake           AMBER Huizar - NP    9/4/2024  Patient seen and examined  Agree with above  Currently trying crackers and chummus  Has only been up ambulating once.  Agree with decreasing the IV pain meds and seeing how that does  Hopefully home soon

## 2024-09-12 ENCOUNTER — OFFICE VISIT (OUTPATIENT)
Age: 74
End: 2024-09-12

## 2024-09-12 VITALS
WEIGHT: 116 LBS | TEMPERATURE: 98.3 F | SYSTOLIC BLOOD PRESSURE: 124 MMHG | BODY MASS INDEX: 19.81 KG/M2 | DIASTOLIC BLOOD PRESSURE: 76 MMHG | OXYGEN SATURATION: 96 % | HEIGHT: 64 IN | HEART RATE: 71 BPM | RESPIRATION RATE: 16 BRPM

## 2024-09-12 DIAGNOSIS — K35.30 ACUTE APPENDICITIS WITH LOCALIZED PERITONITIS, WITHOUT PERFORATION, ABSCESS, OR GANGRENE: Primary | ICD-10-CM

## 2024-09-12 PROCEDURE — 99024 POSTOP FOLLOW-UP VISIT: CPT | Performed by: PHYSICIAN ASSISTANT

## 2024-09-12 ASSESSMENT — PATIENT HEALTH QUESTIONNAIRE - PHQ9
3. TROUBLE FALLING OR STAYING ASLEEP: SEVERAL DAYS
6. FEELING BAD ABOUT YOURSELF - OR THAT YOU ARE A FAILURE OR HAVE LET YOURSELF OR YOUR FAMILY DOWN: SEVERAL DAYS
SUM OF ALL RESPONSES TO PHQ QUESTIONS 1-9: 9
5. POOR APPETITE OR OVEREATING: NOT AT ALL
8. MOVING OR SPEAKING SO SLOWLY THAT OTHER PEOPLE COULD HAVE NOTICED. OR THE OPPOSITE, BEING SO FIGETY OR RESTLESS THAT YOU HAVE BEEN MOVING AROUND A LOT MORE THAN USUAL: NOT AT ALL
SUM OF ALL RESPONSES TO PHQ QUESTIONS 1-9: 9
SUM OF ALL RESPONSES TO PHQ9 QUESTIONS 1 & 2: 6
SUM OF ALL RESPONSES TO PHQ QUESTIONS 1-9: 9
2. FEELING DOWN, DEPRESSED OR HOPELESS: NEARLY EVERY DAY
4. FEELING TIRED OR HAVING LITTLE ENERGY: SEVERAL DAYS
1. LITTLE INTEREST OR PLEASURE IN DOING THINGS: NEARLY EVERY DAY
9. THOUGHTS THAT YOU WOULD BE BETTER OFF DEAD, OR OF HURTING YOURSELF: NOT AT ALL
SUM OF ALL RESPONSES TO PHQ QUESTIONS 1-9: 9
7. TROUBLE CONCENTRATING ON THINGS, SUCH AS READING THE NEWSPAPER OR WATCHING TELEVISION: NOT AT ALL
10. IF YOU CHECKED OFF ANY PROBLEMS, HOW DIFFICULT HAVE THESE PROBLEMS MADE IT FOR YOU TO DO YOUR WORK, TAKE CARE OF THINGS AT HOME, OR GET ALONG WITH OTHER PEOPLE: SOMEWHAT DIFFICULT

## 2024-09-13 ENCOUNTER — CLINICAL DOCUMENTATION (OUTPATIENT)
Age: 74
End: 2024-09-13

## 2024-09-16 ENCOUNTER — TELEPHONE (OUTPATIENT)
Age: 74
End: 2024-09-16

## 2024-09-18 ENCOUNTER — TELEPHONE (OUTPATIENT)
Age: 74
End: 2024-09-18

## 2024-09-18 NOTE — TELEPHONE ENCOUNTER
Patient would like to speak with pérez about her next appointment she needs to make. She has a few other questions as well about her plan of care.

## 2025-03-03 RX ORDER — OMEPRAZOLE 40 MG/1
40 CAPSULE, DELAYED RELEASE ORAL
Qty: 30 CAPSULE | Refills: 5 | OUTPATIENT
Start: 2025-03-03

## (undated) DEVICE — STAPLER 60MM POWERED ECHELON 3000  SHORT 340MM

## (undated) DEVICE — TROCAR: Brand: KII® SLEEVE

## (undated) DEVICE — GENERAL LAPAROSCOPY - SMH: Brand: MEDLINE INDUSTRIES, INC.

## (undated) DEVICE — 1LYRTR 16FR10ML 100%SILI SNAP: Brand: MEDLINE INDUSTRIES, INC.

## (undated) DEVICE — BLADE SURG NO15 C STL GLASSVAN

## (undated) DEVICE — MASTISOL ADHESIVE LIQ 2/3ML

## (undated) DEVICE — 3M™ TEGADERM™ TRANSPARENT FILM DRESSING FRAME STYLE, 1626W, 4 IN X 4-3/4 IN (10 CM X 12 CM), 50/CT 4CT/CASE: Brand: 3M™ TEGADERM™

## (undated) DEVICE — TROCAR: Brand: KII® OPTICAL ACCESS SYSTEM

## (undated) DEVICE — DISSECTOR ULTRASONIC SONICISION 7 5MM 39CM CURVED JAW CORDLESS

## (undated) DEVICE — X-RAY DETECTABLE SPONGES,16 PLY: Brand: VISTEC

## (undated) DEVICE — HYPODERMIC SAFETY NEEDLE: Brand: MONOJECT

## (undated) DEVICE — SUTURE VICRYL + SZ 0 L27IN ABSRB VLT L26MM UR-6 5/8 CIR VCP603H

## (undated) DEVICE — 4-PORT MANIFOLD: Brand: NEPTUNE 2

## (undated) DEVICE — TROCAR: Brand: KII FIOS FIRST ENTRY

## (undated) DEVICE — SOLUTION IV 1000ML 0.9% SOD CHL PH 5 INJ USP VIAFLX PLAS

## (undated) DEVICE — BAG SPEC REM 224ML W4XL6IN DIA10MM 1 HND GYN DISP ENDOPCH

## (undated) DEVICE — GLOVE SURG SZ 65 L12IN FNGR THK94MIL STD WHT LTX FREE

## (undated) DEVICE — GAUZE,SPONGE,2"X2",8PLY,STERILE,LF,2'S: Brand: MEDLINE

## (undated) DEVICE — RELOAD STPL L60MM H1-2.6MM MESENTERY THN TISS WHT 6 ROW

## (undated) DEVICE — Device

## (undated) DEVICE — SUTURE MONOCRYL + SZ 4-0 L27IN ABSRB UD L19MM PS-2 3/8 CIR MCP426H

## (undated) DEVICE — STRIP,CLOSURE,WOUND,MEDI-STRIP,1/2X4: Brand: MEDLINE

## (undated) DEVICE — GARMENT,MEDLINE,DVT,INT,CALF,MED, GEN2: Brand: MEDLINE

## (undated) DEVICE — INSUFFLATION NEEDLE TO ESTABLISH PNEUMOPERITONEUM.: Brand: INSUFFLATION NEEDLE